# Patient Record
Sex: FEMALE | Race: WHITE | NOT HISPANIC OR LATINO | Employment: FULL TIME | URBAN - METROPOLITAN AREA
[De-identification: names, ages, dates, MRNs, and addresses within clinical notes are randomized per-mention and may not be internally consistent; named-entity substitution may affect disease eponyms.]

---

## 2019-06-19 ENCOUNTER — OFFICE VISIT (OUTPATIENT)
Dept: INTERNAL MEDICINE CLINIC | Facility: CLINIC | Age: 54
End: 2019-06-19
Payer: COMMERCIAL

## 2019-06-19 VITALS
SYSTOLIC BLOOD PRESSURE: 120 MMHG | HEART RATE: 78 BPM | DIASTOLIC BLOOD PRESSURE: 72 MMHG | RESPIRATION RATE: 18 BRPM | WEIGHT: 117 LBS | HEIGHT: 62 IN | BODY MASS INDEX: 21.53 KG/M2

## 2019-06-19 DIAGNOSIS — Z13.6 SCREENING FOR HEART DISEASE: ICD-10-CM

## 2019-06-19 DIAGNOSIS — R53.83 FATIGUE, UNSPECIFIED TYPE: ICD-10-CM

## 2019-06-19 DIAGNOSIS — Z20.5 EXPOSURE TO HEPATITIS: Primary | ICD-10-CM

## 2019-06-19 DIAGNOSIS — M79.10 MYALGIA: ICD-10-CM

## 2019-06-19 PROBLEM — M47.816 LUMBAR FACET JOINT SYNDROME: Status: ACTIVE | Noted: 2019-06-19

## 2019-06-19 PROCEDURE — 3008F BODY MASS INDEX DOCD: CPT | Performed by: PHYSICIAN ASSISTANT

## 2019-06-19 PROCEDURE — 99204 OFFICE O/P NEW MOD 45 MIN: CPT | Performed by: PHYSICIAN ASSISTANT

## 2019-06-22 LAB — HCV AB SER-ACNC: <0.1

## 2019-06-25 LAB
25(OH)D3+25(OH)D2 SERPL-MCNC: 18.7 NG/ML (ref 30–100)
ALBUMIN SERPL-MCNC: 4.7 G/DL (ref 3.5–5.5)
ALBUMIN/GLOB SERPL: 2.1 {RATIO} (ref 1.2–2.2)
ALP SERPL-CCNC: 80 IU/L (ref 39–117)
ALT SERPL-CCNC: 11 IU/L (ref 0–32)
APPEARANCE UR: CLEAR
AST SERPL-CCNC: 20 IU/L (ref 0–40)
BACTERIA URNS QL MICRO: NORMAL
BASOPHILS # BLD AUTO: 0.1 X10E3/UL (ref 0–0.2)
BASOPHILS NFR BLD AUTO: 1 %
BILIRUB SERPL-MCNC: 0.3 MG/DL (ref 0–1.2)
BILIRUB UR QL STRIP: NEGATIVE
BUN SERPL-MCNC: 19 MG/DL (ref 6–24)
BUN/CREAT SERPL: 25 (ref 9–23)
CALCIUM SERPL-MCNC: 10 MG/DL (ref 8.7–10.2)
CHLORIDE SERPL-SCNC: 105 MMOL/L (ref 96–106)
CHOLEST SERPL-MCNC: 240 MG/DL (ref 100–199)
CO2 SERPL-SCNC: 22 MMOL/L (ref 20–29)
COLOR UR: YELLOW
CREAT SERPL-MCNC: 0.76 MG/DL (ref 0.57–1)
EOSINOPHIL # BLD AUTO: 0.5 X10E3/UL (ref 0–0.4)
EOSINOPHIL NFR BLD AUTO: 6 %
EPI CELLS #/AREA URNS HPF: NORMAL /HPF (ref 0–10)
ERYTHROCYTE [DISTWIDTH] IN BLOOD BY AUTOMATED COUNT: 14.1 % (ref 12.3–15.4)
GLOBULIN SER-MCNC: 2.2 G/DL (ref 1.5–4.5)
GLUCOSE SERPL-MCNC: 102 MG/DL (ref 65–99)
GLUCOSE UR QL: NEGATIVE
HAV IGM SERPL QL IA: NEGATIVE
HBV CORE IGM SERPL QL IA: NEGATIVE
HBV SURFACE AG SERPL QL IA: NEGATIVE
HCT VFR BLD AUTO: 42.2 % (ref 34–46.6)
HCV AB S/CO SERPL IA: <0.1 S/CO RATIO (ref 0–0.9)
HDLC SERPL-MCNC: 59 MG/DL
HGB BLD-MCNC: 14.4 G/DL (ref 11.1–15.9)
HGB UR QL STRIP: NEGATIVE
IMM GRANULOCYTES # BLD: 0 X10E3/UL (ref 0–0.1)
IMM GRANULOCYTES NFR BLD: 0 %
KETONES UR QL STRIP: NEGATIVE
LDLC SERPL CALC-MCNC: 148 MG/DL (ref 0–99)
LEUKOCYTE ESTERASE UR QL STRIP: NEGATIVE
LYMPHOCYTES # BLD AUTO: 3.3 X10E3/UL (ref 0.7–3.1)
LYMPHOCYTES NFR BLD AUTO: 36 %
MCH RBC QN AUTO: 31 PG (ref 26.6–33)
MCHC RBC AUTO-ENTMCNC: 34.1 G/DL (ref 31.5–35.7)
MCV RBC AUTO: 91 FL (ref 79–97)
MICRO URNS: NORMAL
MICRO URNS: NORMAL
MONOCYTES # BLD AUTO: 0.5 X10E3/UL (ref 0.1–0.9)
MONOCYTES NFR BLD AUTO: 5 %
NEUTROPHILS # BLD AUTO: 4.9 X10E3/UL (ref 1.4–7)
NEUTROPHILS NFR BLD AUTO: 52 %
NITRITE UR QL STRIP: NEGATIVE
PH UR STRIP: 6 [PH] (ref 5–7.5)
PLATELET # BLD AUTO: 302 X10E3/UL (ref 150–450)
POTASSIUM SERPL-SCNC: 4.4 MMOL/L (ref 3.5–5.2)
PROT SERPL-MCNC: 6.9 G/DL (ref 6–8.5)
PROT UR QL STRIP: NEGATIVE
RBC # BLD AUTO: 4.64 X10E6/UL (ref 3.77–5.28)
RBC #/AREA URNS HPF: NORMAL /HPF (ref 0–2)
SL AMB EGFR AFRICAN AMERICAN: 103 ML/MIN/1.73
SL AMB EGFR NON AFRICAN AMERICAN: 89 ML/MIN/1.73
SL AMB VLDL CHOLESTEROL CALC: 33 MG/DL (ref 5–40)
SODIUM SERPL-SCNC: 143 MMOL/L (ref 134–144)
SP GR UR: 1.01 (ref 1–1.03)
T4 FREE SERPL-MCNC: 1.42 NG/DL (ref 0.82–1.77)
TRIGL SERPL-MCNC: 165 MG/DL (ref 0–149)
TSH SERPL DL<=0.005 MIU/L-ACNC: 1.28 UIU/ML (ref 0.45–4.5)
UROBILINOGEN UR STRIP-ACNC: 0.2 EU/DL (ref 0.2–1)
WBC # BLD AUTO: 9.2 X10E3/UL (ref 3.4–10.8)
WBC #/AREA URNS HPF: NORMAL /HPF (ref 0–5)

## 2019-07-17 ENCOUNTER — OFFICE VISIT (OUTPATIENT)
Dept: INTERNAL MEDICINE CLINIC | Facility: CLINIC | Age: 54
End: 2019-07-17
Payer: COMMERCIAL

## 2019-07-17 VITALS
HEART RATE: 73 BPM | DIASTOLIC BLOOD PRESSURE: 74 MMHG | BODY MASS INDEX: 21.71 KG/M2 | OXYGEN SATURATION: 99 % | WEIGHT: 118 LBS | SYSTOLIC BLOOD PRESSURE: 124 MMHG | TEMPERATURE: 98 F | HEIGHT: 62 IN | RESPIRATION RATE: 18 BRPM

## 2019-07-17 DIAGNOSIS — E78.5 HYPERLIPIDEMIA, UNSPECIFIED HYPERLIPIDEMIA TYPE: Primary | ICD-10-CM

## 2019-07-17 DIAGNOSIS — E55.9 VITAMIN D DEFICIENCY: ICD-10-CM

## 2019-07-17 PROCEDURE — 3008F BODY MASS INDEX DOCD: CPT | Performed by: PHYSICIAN ASSISTANT

## 2019-07-17 PROCEDURE — 99213 OFFICE O/P EST LOW 20 MIN: CPT | Performed by: PHYSICIAN ASSISTANT

## 2019-07-17 RX ORDER — ERGOCALCIFEROL 1.25 MG/1
50000 CAPSULE ORAL WEEKLY
Qty: 4 CAPSULE | Refills: 0 | Status: SHIPPED | OUTPATIENT
Start: 2019-07-17 | End: 2019-08-16 | Stop reason: SDUPTHER

## 2019-07-17 RX ORDER — ATORVASTATIN CALCIUM 10 MG/1
10 TABLET, FILM COATED ORAL DAILY
Qty: 90 TABLET | Refills: 1 | Status: SHIPPED | OUTPATIENT
Start: 2019-07-17 | End: 2020-01-10

## 2019-07-17 NOTE — PATIENT INSTRUCTIONS
Will initiate vitamin-D therapy with vitamin-D to 48810 units once weekly  Will also have patient start atorvastatin (Lipitor) 10 mg 1 at bedtime for elevated cholesterol  Will is patient to obtain OTC Co Q10 intake 1 daily  Schedule follow-up with repeat labs in 4 months  Sooner as needed

## 2019-07-17 NOTE — PROGRESS NOTES
Assessment/Plan:   Patient Instructions   Will initiate vitamin-D therapy with vitamin-D to 50889 units once weekly  Will also have patient start atorvastatin (Lipitor) 10 mg 1 at bedtime for elevated cholesterol  Will is patient to obtain OTC Co Q10 intake 1 daily  Schedule follow-up with repeat labs in 4 months  Sooner as needed  Return in about 4 months (around 11/17/2019) for Next scheduled follow up  Diagnoses and all orders for this visit:    Hyperlipidemia, unspecified hyperlipidemia type  -     Comprehensive metabolic panel; Future  -     Lipid panel; Future  -     atorvastatin (LIPITOR) 10 mg tablet; Take 1 tablet (10 mg total) by mouth daily    Vitamin D deficiency  -     ergocalciferol (VITAMIN D2) 50,000 units; Take 1 capsule (50,000 Units total) by mouth once a week  -     Vitamin D 25 hydroxy; Future          Subjective:      Patient ID: Jacqlyolanda Murillo is a 47 y o  female  Follow-up    Patient reports feeling well  We reviewed her labs today  Vitamin-D deficiency:  Level at 18 7  She is in agreement to start vitamin-D supplementation  She does admit to muscle cramping and twitching  Hyperlipidemia:  Elevated total cholesterol, triglycerides, and LDLs  Family history of hyperlipidemia and coronary artery disease  She is in agreement to start lipid lowering agent  We discussed potential side effects  No other focal complaints  ALLERGIES:  No Known Allergies    CURRENT MEDICATIONS:    Current Outpatient Medications:     atorvastatin (LIPITOR) 10 mg tablet, Take 1 tablet (10 mg total) by mouth daily, Disp: 90 tablet, Rfl: 1    ergocalciferol (VITAMIN D2) 50,000 units, Take 1 capsule (50,000 Units total) by mouth once a week, Disp: 4 capsule, Rfl: 0    ACTIVE PROBLEM LIST:  Patient Active Problem List   Diagnosis    Lumbar facet joint syndrome    Hyperlipidemia    Vitamin D deficiency       PAST MEDICAL HISTORY:  History reviewed   No pertinent past medical history      PAST SURGICAL HISTORY:  Past Surgical History:   Procedure Laterality Date    NASAL FRACTURE SURGERY      OVARIAN CYST REMOVAL      RADIOFREQUENCY ABLATION      UTERINE FIBROID SURGERY         FAMILY HISTORY:  Family History   Problem Relation Age of Onset    Uterine cancer Mother     Diabetes Mother     Coronary artery disease Father     Lung cancer Father     Uterine cancer Sister     Hyperlipidemia Brother     Coronary artery disease Maternal Grandmother     Cancer Maternal Grandfather     Coronary artery disease Paternal Grandfather        SOCIAL HISTORY:  Social History     Socioeconomic History    Marital status: Single     Spouse name: Not on file    Number of children: Not on file    Years of education: Not on file    Highest education level: Not on file   Occupational History    Not on file   Social Needs    Financial resource strain: Not on file    Food insecurity:     Worry: Not on file     Inability: Not on file    Transportation needs:     Medical: Not on file     Non-medical: Not on file   Tobacco Use    Smoking status: Current Every Day Smoker     Packs/day: 0 50     Years: 30 00     Pack years: 15 00    Smokeless tobacco: Never Used   Substance and Sexual Activity    Alcohol use: Yes     Frequency: Monthly or less     Drinks per session: 1 or 2    Drug use: Never    Sexual activity: Yes     Partners: Male   Lifestyle    Physical activity:     Days per week: Not on file     Minutes per session: Not on file    Stress: Not on file   Relationships    Social connections:     Talks on phone: Not on file     Gets together: Not on file     Attends [employer] service: Not on file     Active member of club or organization: Not on file     Attends meetings of clubs or organizations: Not on file     Relationship status: Not on file    Intimate partner violence:     Fear of current or ex partner: Not on file     Emotionally abused: Not on file     Physically abused: Not on file     Forced sexual activity: Not on file   Other Topics Concern    Not on file   Social History Narrative        No children        Hobbies-horseback riding    Regular dental care, brushes twice a day       Review of Systems   Constitutional: Negative for activity change, chills, fatigue and fever  HENT: Negative for congestion  Eyes: Negative for discharge  Respiratory: Negative for cough, chest tightness and shortness of breath  Cardiovascular: Negative for chest pain, palpitations and leg swelling  Gastrointestinal: Negative for abdominal pain  Genitourinary: Negative for difficulty urinating  Musculoskeletal: Negative for arthralgias and myalgias  Skin: Negative for rash  Allergic/Immunologic: Negative for immunocompromised state  Neurological: Negative for dizziness, syncope, weakness, light-headedness and headaches  Hematological: Negative for adenopathy  Does not bruise/bleed easily  Psychiatric/Behavioral: Negative for dysphoric mood and sleep disturbance  The patient is not nervous/anxious  Objective:  Vitals:    07/17/19 1511   BP: 124/74   BP Location: Left arm   Patient Position: Sitting   Pulse: 73   Resp: 18   Temp: 98 °F (36 7 °C)   SpO2: 99%   Weight: 53 5 kg (118 lb)   Height: 5' 2" (1 575 m)     Body mass index is 21 58 kg/m²  Physical Exam   Constitutional: She is oriented to person, place, and time  She appears well-developed and well-nourished  No distress  Neck: Neck supple  Cardiovascular: Normal rate, regular rhythm and normal heart sounds  Pulmonary/Chest: Effort normal and breath sounds normal    Musculoskeletal: She exhibits no edema  Lymphadenopathy:     She has no cervical adenopathy  Neurological: She is alert and oriented to person, place, and time  Skin: Skin is warm and dry  No rash noted  Psychiatric: She has a normal mood and affect   Her behavior is normal    Nursing note and vitals reviewed          RESULTS:    Recent Results (from the past 1008 hour(s))   Hepatitis C antibody    Collection Time: 06/22/19 12:00 AM   Result Value Ref Range    HEP C AB <0 1    CBC and differential    Collection Time: 06/22/19 10:22 AM   Result Value Ref Range    White Blood Cell Count 9 2 3 4 - 10 8 x10E3/uL    Red Blood Cell Count 4 64 3 77 - 5 28 x10E6/uL    Hemoglobin 14 4 11 1 - 15 9 g/dL    HCT 42 2 34 0 - 46 6 %    MCV 91 79 - 97 fL    MCH 31 0 26 6 - 33 0 pg    MCHC 34 1 31 5 - 35 7 g/dL    RDW 14 1 12 3 - 15 4 %    Platelet Count 275 793 - 450 x10E3/uL    Neutrophils 52 Not Estab  %    Lymphocytes 36 Not Estab  %    Monocytes 5 Not Estab  %    Eosinophils 6 Not Estab  %    Basophils PCT 1 Not Estab  %    Neutrophils (Absolute) 4 9 1 4 - 7 0 x10E3/uL    Lymphocytes (Absolute) 3 3 (H) 0 7 - 3 1 x10E3/uL    Monocytes (Absolute) 0 5 0 1 - 0 9 x10E3/uL    Eosinophils (Absolute) 0 5 (H) 0 0 - 0 4 x10E3/uL    Basophils ABS 0 1 0 0 - 0 2 x10E3/uL    Immature Granulocytes 0 Not Estab  %    Immature Granulocytes (Absolute) 0 0 0 0 - 0 1 x10E3/uL   Comprehensive metabolic panel    Collection Time: 06/22/19 10:22 AM   Result Value Ref Range    Glucose, Random 102 (H) 65 - 99 mg/dL    BUN 19 6 - 24 mg/dL    Creatinine 0 76 0 57 - 1 00 mg/dL    eGFR Non  89 >59 mL/min/1 73    eGFR  103 >59 mL/min/1 73    SL AMB BUN/CREATININE RATIO 25 (H) 9 - 23    Sodium 143 134 - 144 mmol/L    Potassium 4 4 3 5 - 5 2 mmol/L    Chloride 105 96 - 106 mmol/L    CO2 22 20 - 29 mmol/L    CALCIUM 10 0 8 7 - 10 2 mg/dL    Protein, Total 6 9 6 0 - 8 5 g/dL    Albumin 4 7 3 5 - 5 5 g/dL    Globulin, Total 2 2 1 5 - 4 5 g/dL    Albumin/Globulin Ratio 2 1 1 2 - 2 2    TOTAL BILIRUBIN 0 3 0 0 - 1 2 mg/dL    Alk Phos Isoenzymes 80 39 - 117 IU/L    AST 20 0 - 40 IU/L    ALT 11 0 - 32 IU/L   Urinalysis with microscopic    Collection Time: 06/22/19 10:22 AM   Result Value Ref Range    Specific Gravity 1 012 1 005 - 1 030    Ph 6 0 5 0 - 7 5    Color UA Yellow Yellow    Urine Appearance Clear Clear    Leukocyte Esterase Negative Negative    Protein Negative Negative/Trace    Glucose, 24 HR Urine Negative Negative    Ketone, Urine Negative Negative    Blood, Urine Negative Negative    Bilirubin, Urine Negative Negative    Urobilinogen Urine 0 2 0 2 - 1 0 EU/dL    SL AMB NITRITES URINE, QUAL  Negative Negative    Microscopic Examination Comment     Microscopic Examination See below:    Microscopic Examination    Collection Time: 06/22/19 10:22 AM   Result Value Ref Range    SL AMB WBC, URINE None seen 0 - 5 /hpf    RBC, Urine 0-2 0 - 2 /hpf    Epithelial Cells (non renal) 0-10 0 - 10 /hpf    Bacteria, Urine Few None seen/Few   Lipid panel    Collection Time: 06/22/19 10:22 AM   Result Value Ref Range    Cholesterol, Total 240 (H) 100 - 199 mg/dL    Triglycerides 165 (H) 0 - 149 mg/dL    HDL 59 >39 mg/dL    VLDL Cholesterol Calculated 33 5 - 40 mg/dL    LDL Direct 148 (H) 0 - 99 mg/dL   Hepatitis panel, acute    Collection Time: 06/22/19 10:22 AM   Result Value Ref Range    Hep A Ab, IgM Negative Negative    HBsAg Screen Negative Negative    Hep B Core Ab, IgM Negative Negative    HEP C AB <0 1 0 0 - 0 9 s/co ratio   T4, free    Collection Time: 06/22/19 10:22 AM   Result Value Ref Range    Free t4 1 42 0 82 - 1 77 ng/dL   TSH, 3rd generation    Collection Time: 06/22/19 10:22 AM   Result Value Ref Range    TSH 1 280 0 450 - 4 500 uIU/mL   Vitamin D 25 hydroxy    Collection Time: 06/22/19 10:22 AM   Result Value Ref Range    25-HYDROXY VIT D 18 7 (L) 30 0 - 100 0 ng/mL       This note was created with voice recognition software  Phonic, grammatical and spelling errors may be present within the note as a result

## 2019-08-16 DIAGNOSIS — E55.9 VITAMIN D DEFICIENCY: ICD-10-CM

## 2019-08-19 RX ORDER — ERGOCALCIFEROL 1.25 MG/1
CAPSULE ORAL
Qty: 4 CAPSULE | Refills: 5 | Status: SHIPPED | OUTPATIENT
Start: 2019-08-19 | End: 2019-12-10 | Stop reason: ALTCHOICE

## 2019-12-10 ENCOUNTER — OFFICE VISIT (OUTPATIENT)
Dept: INTERNAL MEDICINE CLINIC | Facility: CLINIC | Age: 54
End: 2019-12-10
Payer: COMMERCIAL

## 2019-12-10 VITALS
SYSTOLIC BLOOD PRESSURE: 112 MMHG | DIASTOLIC BLOOD PRESSURE: 82 MMHG | HEART RATE: 87 BPM | RESPIRATION RATE: 16 BRPM | OXYGEN SATURATION: 99 % | HEIGHT: 62 IN | BODY MASS INDEX: 22.08 KG/M2 | WEIGHT: 120 LBS

## 2019-12-10 DIAGNOSIS — Z11.4 SCREENING FOR HIV (HUMAN IMMUNODEFICIENCY VIRUS): ICD-10-CM

## 2019-12-10 DIAGNOSIS — Z12.39 SCREENING FOR BREAST CANCER: ICD-10-CM

## 2019-12-10 DIAGNOSIS — E78.5 HYPERLIPIDEMIA, UNSPECIFIED HYPERLIPIDEMIA TYPE: Primary | ICD-10-CM

## 2019-12-10 LAB
25(OH)D3+25(OH)D2 SERPL-MCNC: 49.9 NG/ML (ref 30–100)
ALBUMIN SERPL-MCNC: 4.3 G/DL (ref 3.5–5.5)
ALBUMIN/GLOB SERPL: 1.9 {RATIO} (ref 1.2–2.2)
ALP SERPL-CCNC: 83 IU/L (ref 39–117)
ALT SERPL-CCNC: 11 IU/L (ref 0–32)
AST SERPL-CCNC: 17 IU/L (ref 0–40)
BILIRUB SERPL-MCNC: 0.3 MG/DL (ref 0–1.2)
BUN SERPL-MCNC: 17 MG/DL (ref 6–24)
BUN/CREAT SERPL: 25 (ref 9–23)
CALCIUM SERPL-MCNC: 9.8 MG/DL (ref 8.7–10.2)
CHLORIDE SERPL-SCNC: 107 MMOL/L (ref 96–106)
CHOLEST SERPL-MCNC: 141 MG/DL (ref 100–199)
CO2 SERPL-SCNC: 23 MMOL/L (ref 20–29)
CREAT SERPL-MCNC: 0.68 MG/DL (ref 0.57–1)
GLOBULIN SER-MCNC: 2.3 G/DL (ref 1.5–4.5)
GLUCOSE SERPL-MCNC: 92 MG/DL (ref 65–99)
HDLC SERPL-MCNC: 58 MG/DL
LDLC SERPL CALC-MCNC: 62 MG/DL (ref 0–99)
POTASSIUM SERPL-SCNC: 4.2 MMOL/L (ref 3.5–5.2)
PROT SERPL-MCNC: 6.6 G/DL (ref 6–8.5)
SL AMB EGFR AFRICAN AMERICAN: 115 ML/MIN/1.73
SL AMB EGFR NON AFRICAN AMERICAN: 99 ML/MIN/1.73
SL AMB VLDL CHOLESTEROL CALC: 21 MG/DL (ref 5–40)
SODIUM SERPL-SCNC: 142 MMOL/L (ref 134–144)
TRIGL SERPL-MCNC: 103 MG/DL (ref 0–149)

## 2019-12-10 PROCEDURE — 3008F BODY MASS INDEX DOCD: CPT | Performed by: PHYSICIAN ASSISTANT

## 2019-12-10 PROCEDURE — 4004F PT TOBACCO SCREEN RCVD TLK: CPT | Performed by: PHYSICIAN ASSISTANT

## 2019-12-10 PROCEDURE — 99213 OFFICE O/P EST LOW 20 MIN: CPT | Performed by: PHYSICIAN ASSISTANT

## 2019-12-10 NOTE — PROGRESS NOTES
Assessment/Plan:   Patient Instructions   Continue your cholesterol medication and Co Q10  Can now start vitamin-D 3 1000 units daily  Follow-up in 6 months with repeat labs prior to that visit  Quality Measures: Tobacco Cessation Counseling: Tobacco cessation counseling was provided  The patient is sincerely urged to quit consumption of tobacco  She is not ready to quit tobacco         No follow-ups on file  Diagnoses and all orders for this visit:    Hyperlipidemia, unspecified hyperlipidemia type  -     Comprehensive metabolic panel; Future  -     Lipid panel; Future    Screening for breast cancer  -     Mammo screening bilateral w 3d & cad; Future    Screening for HIV (human immunodeficiency virus)  -     HIV 1/2 AG-AB combo; Future          Subjective:      Patient ID: Rosio Diaz is a 47 y o  female  Follow-up    Labs reviewed with patient and her  was present  Hyperlipidemia:  Labs show significant improvement on low-dose atorvastatin  Patient denies any side effects  She thought since her lipids were significantly improved she would be able to get off the medication  Had to in lightening her to the fact that this was to be a daily medication  Vitamin-D deficiency:  Marked improvement with vitamin-D now in the normal range  Patient also reports she is feeling much better  She will now go 1 daily supplement  Tobacco abuse:  Again encouraged smoking cessation  She again has told me that she is not ready  HIV screening discussed  Patient in agreement  ALLERGIES:  No Known Allergies    CURRENT MEDICATIONS:    Current Outpatient Medications:     atorvastatin (LIPITOR) 10 mg tablet, Take 1 tablet (10 mg total) by mouth daily, Disp: 90 tablet, Rfl: 1    ACTIVE PROBLEM LIST:  Patient Active Problem List   Diagnosis    Lumbar facet joint syndrome    Hyperlipidemia    Vitamin D deficiency       PAST MEDICAL HISTORY:  History reviewed   No pertinent past medical history      PAST SURGICAL HISTORY:  Past Surgical History:   Procedure Laterality Date    NASAL FRACTURE SURGERY      OVARIAN CYST REMOVAL      RADIOFREQUENCY ABLATION      UTERINE FIBROID SURGERY         FAMILY HISTORY:  Family History   Problem Relation Age of Onset    Uterine cancer Mother     Diabetes Mother     Coronary artery disease Father     Lung cancer Father     Uterine cancer Sister     Hyperlipidemia Brother     Coronary artery disease Maternal Grandmother     Cancer Maternal Grandfather     Coronary artery disease Paternal Grandfather        SOCIAL HISTORY:  Social History     Socioeconomic History    Marital status: Single     Spouse name: Not on file    Number of children: Not on file    Years of education: Not on file    Highest education level: Not on file   Occupational History    Not on file   Social Needs    Financial resource strain: Not on file    Food insecurity:     Worry: Not on file     Inability: Not on file    Transportation needs:     Medical: Not on file     Non-medical: Not on file   Tobacco Use    Smoking status: Current Every Day Smoker     Packs/day: 0 50     Years: 30 00     Pack years: 15 00    Smokeless tobacco: Never Used   Substance and Sexual Activity    Alcohol use: Yes     Frequency: Monthly or less     Drinks per session: 1 or 2    Drug use: Never    Sexual activity: Yes     Partners: Male   Lifestyle    Physical activity:     Days per week: Not on file     Minutes per session: Not on file    Stress: Not on file   Relationships    Social connections:     Talks on phone: Not on file     Gets together: Not on file     Attends Buddhist service: Not on file     Active member of club or organization: Not on file     Attends meetings of clubs or organizations: Not on file     Relationship status: Not on file    Intimate partner violence:     Fear of current or ex partner: Not on file     Emotionally abused: Not on file     Physically abused: Not on file     Forced sexual activity: Not on file   Other Topics Concern    Not on file   Social History Narrative        No children        Hobbies-horseback riding    Regular dental care, brushes twice a day       Review of Systems   Constitutional: Negative for activity change, chills, fatigue and fever  HENT: Negative for congestion  Eyes: Negative for discharge  Respiratory: Negative for cough, chest tightness and shortness of breath  Cardiovascular: Negative for chest pain, palpitations and leg swelling  Gastrointestinal: Negative for abdominal pain, blood in stool, constipation, diarrhea, nausea and vomiting  Endocrine: Negative for polydipsia, polyphagia and polyuria  Genitourinary: Negative for difficulty urinating  Musculoskeletal: Negative for arthralgias and myalgias  Skin: Negative for rash  Allergic/Immunologic: Negative for immunocompromised state  Neurological: Negative for dizziness, syncope, weakness, light-headedness and headaches  Hematological: Negative for adenopathy  Does not bruise/bleed easily  Psychiatric/Behavioral: Negative for dysphoric mood, sleep disturbance and suicidal ideas  The patient is not nervous/anxious  Objective:  Vitals:    12/10/19 1535   BP: 112/82   BP Location: Left arm   Patient Position: Sitting   Cuff Size: Standard   Pulse: 87   Resp: 16   SpO2: 99%   Weight: 54 4 kg (120 lb)   Height: 5' 2" (1 575 m)     Body mass index is 21 95 kg/m²  Physical Exam   Constitutional: She is oriented to person, place, and time  She appears well-developed and well-nourished  No distress  Neck: Neck supple  No JVD present  Carotid bruit is not present  Cardiovascular: Normal rate, regular rhythm and normal heart sounds  Pulmonary/Chest: Effort normal and breath sounds normal    Musculoskeletal: She exhibits no edema  Lymphadenopathy:     She has no cervical adenopathy     Neurological: She is alert and oriented to person, place, and time  Skin: Skin is warm and dry  No rash noted  Psychiatric: She has a normal mood and affect  Her behavior is normal    Nursing note and vitals reviewed  RESULTS:    Recent Results (from the past 1008 hour(s))   Comprehensive metabolic panel    Collection Time: 12/07/19  8:33 AM   Result Value Ref Range    Glucose, Random 92 65 - 99 mg/dL    BUN 17 6 - 24 mg/dL    Creatinine 0 68 0 57 - 1 00 mg/dL    eGFR Non African American 99 >59 mL/min/1 73    eGFR  115 >59 mL/min/1 73    SL AMB BUN/CREATININE RATIO 25 (H) 9 - 23    Sodium 142 134 - 144 mmol/L    Potassium 4 2 3 5 - 5 2 mmol/L    Chloride 107 (H) 96 - 106 mmol/L    CO2 23 20 - 29 mmol/L    CALCIUM 9 8 8 7 - 10 2 mg/dL    Protein, Total 6 6 6 0 - 8 5 g/dL    Albumin 4 3 3 5 - 5 5 g/dL    Globulin, Total 2 3 1 5 - 4 5 g/dL    Albumin/Globulin Ratio 1 9 1 2 - 2 2    TOTAL BILIRUBIN 0 3 0 0 - 1 2 mg/dL    Alk Phos Isoenzymes 83 39 - 117 IU/L    AST 17 0 - 40 IU/L    ALT 11 0 - 32 IU/L   Lipid panel    Collection Time: 12/07/19  8:33 AM   Result Value Ref Range    Cholesterol, Total 141 100 - 199 mg/dL    Triglycerides 103 0 - 149 mg/dL    HDL 58 >39 mg/dL    VLDL Cholesterol Calculated 21 5 - 40 mg/dL    LDL Direct 62 0 - 99 mg/dL   Vitamin D 25 hydroxy    Collection Time: 12/07/19  8:33 AM   Result Value Ref Range    25-HYDROXY VIT D 49 9 30 0 - 100 0 ng/mL       This note was created with voice recognition software  Phonic, grammatical and spelling errors may be present within the note as a result

## 2019-12-10 NOTE — PATIENT INSTRUCTIONS
Continue your cholesterol medication and Co Q10  Can now start vitamin-D 3 1000 units daily  Follow-up in 6 months with repeat labs prior to that visit

## 2020-01-10 DIAGNOSIS — E78.5 HYPERLIPIDEMIA, UNSPECIFIED HYPERLIPIDEMIA TYPE: ICD-10-CM

## 2020-01-10 RX ORDER — ATORVASTATIN CALCIUM 10 MG/1
TABLET, FILM COATED ORAL
Qty: 90 TABLET | Refills: 1 | Status: SHIPPED | OUTPATIENT
Start: 2020-01-10 | End: 2020-07-20

## 2020-06-17 LAB — EXTERNAL HIV SCREEN: NORMAL

## 2020-06-19 LAB
ALBUMIN SERPL-MCNC: 4.6 G/DL (ref 3.8–4.9)
ALBUMIN/GLOB SERPL: 2.1 {RATIO} (ref 1.2–2.2)
ALP SERPL-CCNC: 85 IU/L (ref 39–117)
ALT SERPL-CCNC: 17 IU/L (ref 0–32)
AST SERPL-CCNC: 20 IU/L (ref 0–40)
BILIRUB SERPL-MCNC: 0.4 MG/DL (ref 0–1.2)
BUN SERPL-MCNC: 22 MG/DL (ref 6–24)
BUN/CREAT SERPL: 28 (ref 9–23)
CALCIUM SERPL-MCNC: 9.8 MG/DL (ref 8.7–10.2)
CHLORIDE SERPL-SCNC: 103 MMOL/L (ref 96–106)
CHOLEST SERPL-MCNC: 165 MG/DL (ref 100–199)
CO2 SERPL-SCNC: 23 MMOL/L (ref 20–29)
CREAT SERPL-MCNC: 0.8 MG/DL (ref 0.57–1)
GLOBULIN SER-MCNC: 2.2 G/DL (ref 1.5–4.5)
GLUCOSE SERPL-MCNC: 78 MG/DL (ref 65–99)
HDLC SERPL-MCNC: 57 MG/DL
HIV 1+2 AB+HIV1 P24 AG SERPL QL IA: NON REACTIVE
LDLC SERPL CALC-MCNC: 86 MG/DL (ref 0–99)
POTASSIUM SERPL-SCNC: 4.5 MMOL/L (ref 3.5–5.2)
PROT SERPL-MCNC: 6.8 G/DL (ref 6–8.5)
SL AMB EGFR AFRICAN AMERICAN: 96 ML/MIN/1.73
SL AMB EGFR NON AFRICAN AMERICAN: 83 ML/MIN/1.73
SL AMB VLDL CHOLESTEROL CALC: 22 MG/DL (ref 5–40)
SODIUM SERPL-SCNC: 141 MMOL/L (ref 134–144)
TRIGL SERPL-MCNC: 111 MG/DL (ref 0–149)

## 2020-07-02 ENCOUNTER — OFFICE VISIT (OUTPATIENT)
Dept: INTERNAL MEDICINE CLINIC | Facility: CLINIC | Age: 55
End: 2020-07-02

## 2020-07-02 ENCOUNTER — TELEPHONE (OUTPATIENT)
Dept: ADMINISTRATIVE | Facility: OTHER | Age: 55
End: 2020-07-02

## 2020-07-02 VITALS
SYSTOLIC BLOOD PRESSURE: 106 MMHG | WEIGHT: 122 LBS | BODY MASS INDEX: 22.45 KG/M2 | HEART RATE: 95 BPM | RESPIRATION RATE: 16 BRPM | TEMPERATURE: 98.3 F | DIASTOLIC BLOOD PRESSURE: 68 MMHG | HEIGHT: 62 IN

## 2020-07-02 DIAGNOSIS — Z72.0 TOBACCO ABUSE: ICD-10-CM

## 2020-07-02 DIAGNOSIS — J98.01 BRONCHOSPASM: ICD-10-CM

## 2020-07-02 DIAGNOSIS — E78.5 HYPERLIPIDEMIA, UNSPECIFIED HYPERLIPIDEMIA TYPE: Primary | ICD-10-CM

## 2020-07-02 PROCEDURE — 4004F PT TOBACCO SCREEN RCVD TLK: CPT | Performed by: PHYSICIAN ASSISTANT

## 2020-07-02 PROCEDURE — 3008F BODY MASS INDEX DOCD: CPT | Performed by: PHYSICIAN ASSISTANT

## 2020-07-02 PROCEDURE — 99212 OFFICE O/P EST SF 10 MIN: CPT | Performed by: PHYSICIAN ASSISTANT

## 2020-07-02 RX ORDER — ALBUTEROL SULFATE 90 UG/1
2 AEROSOL, METERED RESPIRATORY (INHALATION) EVERY 6 HOURS PRN
Qty: 1 INHALER | Refills: 5 | Status: SHIPPED | OUTPATIENT
Start: 2020-07-02 | End: 2021-10-18

## 2020-07-02 NOTE — PROGRESS NOTES
Assessment/Plan:   Patient Instructions   No change in current medication as cholesterol is under good control  Will add albuterol inhaler 1-2 puffs every 6 hours as needed for wheezing, however I a m  Recommending you use it regularly for the next 5-10 days  Then potentially can use as needed  Consider smoking cessation as we discussed  Otherwise follow-up in 6 months, sooner as needed  Quality Measures:       Return in about 6 months (around 1/2/2021) for Next scheduled follow up  Diagnoses and all orders for this visit:    Hyperlipidemia, unspecified hyperlipidemia type  -     Comprehensive metabolic panel; Future  -     Lipid panel; Future    Bronchospasm  -     albuterol (Ventolin HFA) 90 mcg/act inhaler; Inhale 2 puffs every 6 (six) hours as needed for wheezing    Tobacco abuse          Subjective:      Patient ID: Meenakshi Hightower is a 54 y o  female  Follow-up    Hyperlipidemia:  Good control on current medication  Denies side effects  Labs reviewed with patient  Ongoing cough since March  He is a smoker currently at about 16 cigarettes per day  No fever chills  Tobacco abuse:  Long discussion held with patient and her  on the different modes of smoking cessation that are available to her  Patient will considering get back to me  ALLERGIES:  No Known Allergies    CURRENT MEDICATIONS:    Current Outpatient Medications:     atorvastatin (LIPITOR) 10 mg tablet, TAKE 1 TABLET BY MOUTH EVERY DAY, Disp: 90 tablet, Rfl: 1    albuterol (Ventolin HFA) 90 mcg/act inhaler, Inhale 2 puffs every 6 (six) hours as needed for wheezing, Disp: 1 Inhaler, Rfl: 5    ACTIVE PROBLEM LIST:  Patient Active Problem List   Diagnosis    Lumbar facet joint syndrome    Hyperlipidemia    Vitamin D deficiency       PAST MEDICAL HISTORY:  History reviewed  No pertinent past medical history      PAST SURGICAL HISTORY:  Past Surgical History:   Procedure Laterality Date    NASAL FRACTURE SURGERY  OVARIAN CYST REMOVAL      RADIOFREQUENCY ABLATION      UTERINE FIBROID SURGERY         FAMILY HISTORY:  Family History   Problem Relation Age of Onset    Uterine cancer Mother     Diabetes Mother     Coronary artery disease Father     Lung cancer Father     Uterine cancer Sister     Hyperlipidemia Brother     Coronary artery disease Maternal Grandmother     Cancer Maternal Grandfather     Coronary artery disease Paternal Grandfather        SOCIAL HISTORY:  Social History     Socioeconomic History    Marital status: Single     Spouse name: Not on file    Number of children: Not on file    Years of education: Not on file    Highest education level: Not on file   Occupational History    Not on file   Social Needs    Financial resource strain: Not on file    Food insecurity:     Worry: Not on file     Inability: Not on file    Transportation needs:     Medical: Not on file     Non-medical: Not on file   Tobacco Use    Smoking status: Current Every Day Smoker     Packs/day: 0 50     Years: 30 00     Pack years: 15 00    Smokeless tobacco: Never Used   Substance and Sexual Activity    Alcohol use: Yes     Frequency: Monthly or less     Drinks per session: 1 or 2    Drug use: Never    Sexual activity: Yes     Partners: Male   Lifestyle    Physical activity:     Days per week: Not on file     Minutes per session: Not on file    Stress: Not on file   Relationships    Social connections:     Talks on phone: Not on file     Gets together: Not on file     Attends Sikh service: Not on file     Active member of club or organization: Not on file     Attends meetings of clubs or organizations: Not on file     Relationship status: Not on file    Intimate partner violence:     Fear of current or ex partner: Not on file     Emotionally abused: Not on file     Physically abused: Not on file     Forced sexual activity: Not on file   Other Topics Concern    Not on file   Social History Narrative     No children        Hobbies-horseback riding    Regular dental care, brushes twice a day       Review of Systems   Constitutional: Negative for activity change, chills, fatigue and fever  HENT: Negative for congestion and postnasal drip  Eyes: Negative for discharge and visual disturbance  Respiratory: Negative for cough, chest tightness and shortness of breath  Cardiovascular: Negative for chest pain, palpitations and leg swelling  Gastrointestinal: Negative for abdominal pain, blood in stool, constipation, diarrhea and nausea  Endocrine: Negative for polydipsia, polyphagia and polyuria  Genitourinary: Negative for difficulty urinating  Musculoskeletal: Negative for arthralgias and myalgias  Skin: Negative for rash  Allergic/Immunologic: Negative for immunocompromised state  Neurological: Negative for dizziness, syncope, weakness, light-headedness and headaches  Hematological: Negative for adenopathy  Does not bruise/bleed easily  Psychiatric/Behavioral: Negative for dysphoric mood, sleep disturbance and suicidal ideas  The patient is not nervous/anxious  Objective:  Vitals:    07/02/20 0945   BP: 106/68   BP Location: Left arm   Patient Position: Sitting   Cuff Size: Standard   Pulse: 95   Resp: 16   Temp: 98 3 °F (36 8 °C)   TempSrc: Tympanic   Weight: 55 3 kg (122 lb)   Height: 5' 2" (1 575 m)     Body mass index is 22 31 kg/m²  Physical Exam   Constitutional: She is oriented to person, place, and time  She appears well-developed and well-nourished  No distress  HENT:   Head: Normocephalic and atraumatic  Eyes: Pupils are equal, round, and reactive to light  Neck: No JVD present  Carotid bruit is not present  Cardiovascular: Normal rate, regular rhythm and normal heart sounds  Pulmonary/Chest: Effort normal  No respiratory distress  She has wheezes     Scattered late insp wheezes with forced exp wheezes that cause coughing Musculoskeletal: She exhibits no edema  Lymphadenopathy:     She has no cervical adenopathy  Neurological: She is alert and oriented to person, place, and time  Skin: Skin is warm and dry  No rash noted  Psychiatric: She has a normal mood and affect  Her behavior is normal    Nursing note and vitals reviewed  RESULTS:    Recent Results (from the past 1008 hour(s))   Human Immunodeficiency Virus 1/2 Antigen / Antibody ( Fourth Generation) with Reflex Testing    Collection Time: 06/17/20 12:00 AM   Result Value Ref Range    HIV Screen Non-Reactive    Comprehensive metabolic panel    Collection Time: 06/17/20 12:00 AM   Result Value Ref Range    Glucose, Random 78 65 - 99 mg/dL    BUN 22 6 - 24 mg/dL    Creatinine 0 80 0 57 - 1 00 mg/dL    eGFR Non  83 >59 mL/min/1 73    eGFR  96 >59 mL/min/1 73    SL AMB BUN/CREATININE RATIO 28 (H) 9 - 23    Sodium 141 134 - 144 mmol/L    Potassium 4 5 3 5 - 5 2 mmol/L    Chloride 103 96 - 106 mmol/L    CO2 23 20 - 29 mmol/L    CALCIUM 9 8 8 7 - 10 2 mg/dL    Protein, Total 6 8 6 0 - 8 5 g/dL    Albumin 4 6 3 8 - 4 9 g/dL    Globulin, Total 2 2 1 5 - 4 5 g/dL    Albumin/Globulin Ratio 2 1 1 2 - 2 2    TOTAL BILIRUBIN 0 4 0 0 - 1 2 mg/dL    Alk Phos Isoenzymes 85 39 - 117 IU/L    AST 20 0 - 40 IU/L    ALT 17 0 - 32 IU/L   Lipid panel    Collection Time: 06/17/20 12:00 AM   Result Value Ref Range    Cholesterol, Total 165 100 - 199 mg/dL    Triglycerides 111 0 - 149 mg/dL    HDL 57 >39 mg/dL    VLDL Cholesterol Calculated 22 5 - 40 mg/dL    LDL Calculated 86 0 - 99 mg/dL   Human Immunodeficiency Virus 1/2 Antigen / Antibody ( Fourth Generation) with Reflex Testing    Collection Time: 06/17/20 12:00 AM   Result Value Ref Range    HIV Screen 4th Generation wRflx Non Reactive Non Reactive       This note was created with voice recognition software  Phonic, grammatical and spelling errors may be present within the note as a result

## 2020-07-02 NOTE — TELEPHONE ENCOUNTER
Upon review of the In Basket request we were able to locate, review, and update the patient chart as requested for Mammogram     Any additional questions or concerns should be emailed to the Practice Liaisons via [email or web address] email, please do not reply via In Basket      Thank you  Pop Urias MA

## 2020-07-02 NOTE — TELEPHONE ENCOUNTER
----- Message from Beto Vaca, [address] [place] sent at 7/2/2020  9:48 AM EDT -----  Regarding: mammo  07/02/20 9:50 AM    Hello, our patient Angeles Rodriguez has had Mammogram completed/performed  Please assist in updating the patient chart by pulling the Care Everywhere (CE) document  The date of service is 6/2/20       Thank you,  Beto Vaca MA  [facility]

## 2020-07-02 NOTE — PATIENT INSTRUCTIONS
No change in current medication as cholesterol is under good control  Will add albuterol inhaler 1-2 puffs every 6 hours as needed for wheezing, however I a m  Recommending you use it regularly for the next 5-10 days  Then potentially can use as needed  Consider smoking cessation as we discussed  Otherwise follow-up in 6 months, sooner as needed

## 2020-07-20 DIAGNOSIS — E78.5 HYPERLIPIDEMIA, UNSPECIFIED HYPERLIPIDEMIA TYPE: ICD-10-CM

## 2020-07-20 RX ORDER — ATORVASTATIN CALCIUM 10 MG/1
TABLET, FILM COATED ORAL
Qty: 90 TABLET | Refills: 1 | Status: SHIPPED | OUTPATIENT
Start: 2020-07-20 | End: 2021-01-17

## 2021-01-06 LAB
ALBUMIN SERPL-MCNC: 4.6 G/DL (ref 3.8–4.9)
ALBUMIN/GLOB SERPL: 1.8 {RATIO} (ref 1.2–2.2)
ALP SERPL-CCNC: 95 IU/L (ref 39–117)
ALT SERPL-CCNC: 21 IU/L (ref 0–32)
AST SERPL-CCNC: 24 IU/L (ref 0–40)
BILIRUB SERPL-MCNC: 0.3 MG/DL (ref 0–1.2)
BUN SERPL-MCNC: 14 MG/DL (ref 6–24)
BUN/CREAT SERPL: 21 (ref 9–23)
CALCIUM SERPL-MCNC: 10 MG/DL (ref 8.7–10.2)
CHLORIDE SERPL-SCNC: 104 MMOL/L (ref 96–106)
CHOLEST SERPL-MCNC: 170 MG/DL (ref 100–199)
CO2 SERPL-SCNC: 23 MMOL/L (ref 20–29)
CREAT SERPL-MCNC: 0.67 MG/DL (ref 0.57–1)
GLOBULIN SER-MCNC: 2.5 G/DL (ref 1.5–4.5)
GLUCOSE SERPL-MCNC: 90 MG/DL (ref 65–99)
HDLC SERPL-MCNC: 60 MG/DL
LDLC SERPL CALC-MCNC: 86 MG/DL (ref 0–99)
POTASSIUM SERPL-SCNC: 4.6 MMOL/L (ref 3.5–5.2)
PROT SERPL-MCNC: 7.1 G/DL (ref 6–8.5)
SL AMB EGFR AFRICAN AMERICAN: 114 ML/MIN/1.73
SL AMB EGFR NON AFRICAN AMERICAN: 99 ML/MIN/1.73
SL AMB VLDL CHOLESTEROL CALC: 24 MG/DL (ref 5–40)
SODIUM SERPL-SCNC: 142 MMOL/L (ref 134–144)
TRIGL SERPL-MCNC: 141 MG/DL (ref 0–149)

## 2021-01-15 ENCOUNTER — OFFICE VISIT (OUTPATIENT)
Dept: INTERNAL MEDICINE CLINIC | Facility: CLINIC | Age: 56
End: 2021-01-15
Payer: COMMERCIAL

## 2021-01-15 VITALS
SYSTOLIC BLOOD PRESSURE: 102 MMHG | BODY MASS INDEX: 22.45 KG/M2 | WEIGHT: 122 LBS | HEIGHT: 62 IN | RESPIRATION RATE: 18 BRPM | HEART RATE: 81 BPM | TEMPERATURE: 98.6 F | DIASTOLIC BLOOD PRESSURE: 78 MMHG | OXYGEN SATURATION: 99 %

## 2021-01-15 DIAGNOSIS — E78.2 MIXED HYPERLIPIDEMIA: Primary | ICD-10-CM

## 2021-01-15 PROCEDURE — 99213 OFFICE O/P EST LOW 20 MIN: CPT | Performed by: PHYSICIAN ASSISTANT

## 2021-01-15 PROCEDURE — 4004F PT TOBACCO SCREEN RCVD TLK: CPT | Performed by: PHYSICIAN ASSISTANT

## 2021-01-15 RX ORDER — PENICILLIN V POTASSIUM 500 MG/1
500 TABLET ORAL 2 TIMES DAILY
COMMUNITY
Start: 2021-01-04 | End: 2022-03-23 | Stop reason: ALTCHOICE

## 2021-01-15 NOTE — PATIENT INSTRUCTIONS
Continue current medications  Continue to working on smoking cessation  Follow up in 6 months, sooner as needed

## 2021-01-15 NOTE — PROGRESS NOTES
Assessment/Plan:   Patient Instructions   Continue current medications  Continue to working on smoking cessation  Follow up in 6 months, sooner as needed  Quality Measures:       Return in about 6 months (around 7/15/2021) for Annual physical, please make appt for last Pt on a Friday for skin tag removal          Diagnoses and all orders for this visit:    Mixed hyperlipidemia  -     Comprehensive metabolic panel; Future  -     Lipid panel; Future    Other orders  -     penicillin V potassium (VEETID) 500 mg tablet; TAKE 2 TABLETS BY MOUTH NOW, THEN TAKE 1 TABLET BY MOUTH 4 TIMES A DAY FOR 7 DAYS          Subjective:      Patient ID: Reed Tolentino is a 54 y o  female  Follow-up    Hyperlipidemia:  On atorvastatin 10 mg daily, denies side effects  Lipid panel shows good control  ASCVD  Risk score is 2 4% which is good  Tobacco abuse:  Patient remark she is getting ready to quit smoking  She would like her  to join her  She states she will have no trouble doing this by "cold turkey"  Patient has a skin tag on her left side which tends to get caught on her clothing, at times becomes red and inflamed  No other focal concerns  ALLERGIES:  No Known Allergies    CURRENT MEDICATIONS:    Current Outpatient Medications:     albuterol (Ventolin HFA) 90 mcg/act inhaler, Inhale 2 puffs every 6 (six) hours as needed for wheezing, Disp: 1 Inhaler, Rfl: 5    atorvastatin (LIPITOR) 10 mg tablet, TAKE 1 TABLET BY MOUTH EVERY DAY, Disp: 90 tablet, Rfl: 1    penicillin V potassium (VEETID) 500 mg tablet, TAKE 2 TABLETS BY MOUTH NOW, THEN TAKE 1 TABLET BY MOUTH 4 TIMES A DAY FOR 7 DAYS, Disp: , Rfl:     ACTIVE PROBLEM LIST:  Patient Active Problem List   Diagnosis    Lumbar facet joint syndrome    Hyperlipidemia    Vitamin D deficiency       PAST MEDICAL HISTORY:  History reviewed  No pertinent past medical history      PAST SURGICAL HISTORY:  Past Surgical History:   Procedure Laterality Date  NASAL FRACTURE SURGERY      OVARIAN CYST REMOVAL      RADIOFREQUENCY ABLATION      UTERINE FIBROID SURGERY         FAMILY HISTORY:  Family History   Problem Relation Age of Onset    Uterine cancer Mother     Diabetes Mother     Coronary artery disease Father     Lung cancer Father     Uterine cancer Sister     Hyperlipidemia Brother     Coronary artery disease Maternal Grandmother     Cancer Maternal Grandfather     Coronary artery disease Paternal Grandfather        SOCIAL HISTORY:  Social History     Socioeconomic History    Marital status: Single     Spouse name: Not on file    Number of children: Not on file    Years of education: Not on file    Highest education level: Not on file   Occupational History    Not on file   Social Needs    Financial resource strain: Not on file    Food insecurity     Worry: Not on file     Inability: Not on file    Transportation needs     Medical: Not on file     Non-medical: Not on file   Tobacco Use    Smoking status: Current Every Day Smoker     Packs/day: 0 50     Years: 30 00     Pack years: 15 00    Smokeless tobacco: Never Used   Substance and Sexual Activity    Alcohol use: Yes     Frequency: Monthly or less     Drinks per session: 1 or 2    Drug use: Never    Sexual activity: Yes     Partners: Male   Lifestyle    Physical activity     Days per week: Not on file     Minutes per session: Not on file    Stress: Not on file   Relationships    Social connections     Talks on phone: Not on file     Gets together: Not on file     Attends [employer] service: Not on file     Active member of club or organization: Not on file     Attends meetings of clubs or organizations: Not on file     Relationship status: Not on file    Intimate partner violence     Fear of current or ex partner: Not on file     Emotionally abused: Not on file     Physically abused: Not on file     Forced sexual activity: Not on file   Other Topics Concern    Not on file Social History Narrative        No children        Hobbies-horseback riding    Regular dental care, brushes twice a day       Review of Systems   Constitutional: Negative for activity change, chills, fatigue and fever  HENT: Negative for congestion  Eyes: Negative for discharge  Respiratory: Negative for cough, chest tightness and shortness of breath  Cardiovascular: Negative for chest pain, palpitations and leg swelling  Gastrointestinal: Negative for abdominal pain, blood in stool, constipation, diarrhea, nausea and vomiting  Endocrine: Negative for polydipsia, polyphagia and polyuria  Genitourinary: Negative for difficulty urinating  Musculoskeletal: Negative for arthralgias and myalgias  Skin: Negative for rash  Allergic/Immunologic: Negative for immunocompromised state  Neurological: Negative for dizziness, syncope, weakness, light-headedness and headaches  Hematological: Negative for adenopathy  Does not bruise/bleed easily  Psychiatric/Behavioral: Negative for dysphoric mood, sleep disturbance and suicidal ideas  The patient is not nervous/anxious  Objective:  Vitals:    01/15/21 0931   BP: 102/78   BP Location: Right arm   Patient Position: Sitting   Cuff Size: Standard   Pulse: 81   Resp: 18   Temp: 98 6 °F (37 °C)   TempSrc: Tympanic   SpO2: 99%   Weight: 90 5 kg (199 lb 9 6 oz)   Height: 5' 2" (1 575 m)     Body mass index is 36 51 kg/m²  Physical Exam  Vitals signs and nursing note reviewed  Constitutional:       General: She is not in acute distress  Appearance: She is well-developed  HENT:      Head: Normocephalic and atraumatic  Eyes:      Pupils: Pupils are equal, round, and reactive to light  Neck:      Musculoskeletal: Neck supple  Thyroid: No thyromegaly  Vascular: No carotid bruit or JVD  Cardiovascular:      Rate and Rhythm: Normal rate and regular rhythm  Heart sounds: Normal heart sounds  Pulmonary:      Effort: Pulmonary effort is normal  No respiratory distress  Breath sounds: Normal breath sounds  Musculoskeletal:      Right lower leg: No edema  Left lower leg: No edema  Lymphadenopathy:      Cervical: No cervical adenopathy  Skin:     General: Skin is warm and dry  Findings: No rash  Comments:   Skin tag present left side  Currently no signs of infection  Neurological:      General: No focal deficit present  Mental Status: She is alert and oriented to person, place, and time  Mental status is at baseline  Psychiatric:         Mood and Affect: Mood normal          Behavior: Behavior normal            RESULTS:    Recent Results (from the past 1008 hour(s))   Comprehensive metabolic panel    Collection Time: 01/05/21 10:11 AM   Result Value Ref Range    Glucose, Random 90 65 - 99 mg/dL    BUN 14 6 - 24 mg/dL    Creatinine 0 67 0 57 - 1 00 mg/dL    eGFR Non African American 99 >59 mL/min/1 73    eGFR  114 >59 mL/min/1 73    SL AMB BUN/CREATININE RATIO 21 9 - 23    Sodium 142 134 - 144 mmol/L    Potassium 4 6 3 5 - 5 2 mmol/L    Chloride 104 96 - 106 mmol/L    CO2 23 20 - 29 mmol/L    CALCIUM 10 0 8 7 - 10 2 mg/dL    Protein, Total 7 1 6 0 - 8 5 g/dL    Albumin 4 6 3 8 - 4 9 g/dL    Globulin, Total 2 5 1 5 - 4 5 g/dL    Albumin/Globulin Ratio 1 8 1 2 - 2 2    TOTAL BILIRUBIN 0 3 0 0 - 1 2 mg/dL    Alk Phos Isoenzymes 95 39 - 117 IU/L    AST 24 0 - 40 IU/L    ALT 21 0 - 32 IU/L   Lipid panel    Collection Time: 01/05/21 10:11 AM   Result Value Ref Range    Cholesterol, Total 170 100 - 199 mg/dL    Triglycerides 141 0 - 149 mg/dL    HDL 60 >39 mg/dL    VLDL Cholesterol Calculated 24 5 - 40 mg/dL    LDL Calculated 86 0 - 99 mg/dL       This note was created with voice recognition software  Phonic, grammatical and spelling errors may be present within the note as a result

## 2021-01-17 DIAGNOSIS — E78.5 HYPERLIPIDEMIA, UNSPECIFIED HYPERLIPIDEMIA TYPE: ICD-10-CM

## 2021-01-17 RX ORDER — ATORVASTATIN CALCIUM 10 MG/1
TABLET, FILM COATED ORAL
Qty: 90 TABLET | Refills: 1 | Status: SHIPPED | OUTPATIENT
Start: 2021-01-17 | End: 2021-07-20

## 2021-01-18 ENCOUNTER — TELEPHONE (OUTPATIENT)
Dept: ADMINISTRATIVE | Facility: OTHER | Age: 56
End: 2021-01-18

## 2021-01-18 NOTE — TELEPHONE ENCOUNTER
Upon review of the In Basket request we were able to locate, review, and update the patient chart as requested for Pap Smear (HPV) aka Cervical Cancer Screening  Any additional questions or concerns should be emailed to the Practice Liaisons via [email or web address] email, please do not reply via In Basket      Thank you  Amaya Qureshi MA

## 2021-01-18 NOTE — TELEPHONE ENCOUNTER
----- Message from iMlton Quick sent at 1/14/2021  3:46 PM EST -----  Regarding: Pap smear  01/14/21 3:46 PM    Hello, our patient Monica Gill has had Pap Smear (HPV) aka Cervical Cancer Screening completed/performed  Please assist in updating the patient chart by pulling the Care Everywhere (CE) document  The date of service is 8/9/2018       Thank you,  Milton Quick  [facility]

## 2021-01-29 ENCOUNTER — OFFICE VISIT (OUTPATIENT)
Dept: INTERNAL MEDICINE CLINIC | Facility: CLINIC | Age: 56
End: 2021-01-29
Payer: COMMERCIAL

## 2021-01-29 VITALS
TEMPERATURE: 98.4 F | HEIGHT: 62 IN | OXYGEN SATURATION: 97 % | BODY MASS INDEX: 22.23 KG/M2 | SYSTOLIC BLOOD PRESSURE: 122 MMHG | HEART RATE: 87 BPM | DIASTOLIC BLOOD PRESSURE: 78 MMHG | WEIGHT: 120.8 LBS

## 2021-01-29 DIAGNOSIS — L91.8 SKIN TAG: Primary | ICD-10-CM

## 2021-01-29 PROCEDURE — 3008F BODY MASS INDEX DOCD: CPT | Performed by: PHYSICIAN ASSISTANT

## 2021-01-29 PROCEDURE — 11200 RMVL SKIN TAGS UP TO&INC 15: CPT | Performed by: PHYSICIAN ASSISTANT

## 2021-01-29 NOTE — PROGRESS NOTES
Assessment/Plan:      Clean area twice daily with soap and water  Dress with light antibiotic ointment and Band-Aid  Call with any signs of infection  Quality Measures:       No follow-ups on file  Diagnoses and all orders for this visit:    Skin tag  Comments:  inflamed-L axilla          Subjective:      Patient ID: Aries Aguilar is a 54 y o  female  Acute visit     Skin tags left axilla which intermittently becomes inflamed with her bra  Rubbing on it  If possible patient would like this removed  ALLERGIES:  No Known Allergies    CURRENT MEDICATIONS:    Current Outpatient Medications:     albuterol (Ventolin HFA) 90 mcg/act inhaler, Inhale 2 puffs every 6 (six) hours as needed for wheezing, Disp: 1 Inhaler, Rfl: 5    atorvastatin (LIPITOR) 10 mg tablet, TAKE 1 TABLET BY MOUTH EVERY DAY, Disp: 90 tablet, Rfl: 1    penicillin V potassium (VEETID) 500 mg tablet, 500 mg 2 (two) times a day , Disp: , Rfl:     ACTIVE PROBLEM LIST:  Patient Active Problem List   Diagnosis    Lumbar facet joint syndrome    Hyperlipidemia    Vitamin D deficiency       PAST MEDICAL HISTORY:  History reviewed  No pertinent past medical history      PAST SURGICAL HISTORY:  Past Surgical History:   Procedure Laterality Date    NASAL FRACTURE SURGERY      OVARIAN CYST REMOVAL      RADIOFREQUENCY ABLATION      UTERINE FIBROID SURGERY         FAMILY HISTORY:  Family History   Problem Relation Age of Onset    Uterine cancer Mother     Diabetes Mother     Coronary artery disease Father     Lung cancer Father     Uterine cancer Sister     Hyperlipidemia Brother     Coronary artery disease Maternal Grandmother     Cancer Maternal Grandfather     Coronary artery disease Paternal Grandfather        SOCIAL HISTORY:  Social History     Socioeconomic History    Marital status: Single     Spouse name: Not on file    Number of children: Not on file    Years of education: Not on file    Highest education level: Not on file   Occupational History    Not on file   Social Needs    Financial resource strain: Not on file    Food insecurity     Worry: Not on file     Inability: Not on file    Transportation needs     Medical: Not on file     Non-medical: Not on file   Tobacco Use    Smoking status: Current Every Day Smoker     Packs/day: 0 50     Years: 30 00     Pack years: 15 00    Smokeless tobacco: Never Used   Substance and Sexual Activity    Alcohol use: Yes     Frequency: Monthly or less     Drinks per session: 1 or 2    Drug use: Never    Sexual activity: Yes     Partners: Male   Lifestyle    Physical activity     Days per week: Not on file     Minutes per session: Not on file    Stress: Not on file   Relationships    Social connections     Talks on phone: Not on file     Gets together: Not on file     Attends Gnosticist service: Not on file     Active member of club or organization: Not on file     Attends meetings of clubs or organizations: Not on file     Relationship status: Not on file    Intimate partner violence     Fear of current or ex partner: Not on file     Emotionally abused: Not on file     Physically abused: Not on file     Forced sexual activity: Not on file   Other Topics Concern    Not on file   Social History Narrative        No children        Hobbies-horseback riding    Regular dental care, brushes twice a day       Review of Systems   Constitutional: Negative for activity change, chills, fatigue and fever  HENT: Negative for congestion  Eyes: Negative for discharge  Respiratory: Negative for cough, chest tightness and shortness of breath  Cardiovascular: Negative for chest pain, palpitations and leg swelling  Gastrointestinal: Negative for abdominal pain  Endocrine: Negative for polydipsia, polyphagia and polyuria  Genitourinary: Negative for difficulty urinating  Musculoskeletal: Negative for arthralgias and myalgias     Skin: Positive for rash ( As per HPI)  Allergic/Immunologic: Negative for immunocompromised state  Neurological: Negative for dizziness, syncope, weakness, light-headedness and headaches  Hematological: Negative for adenopathy  Does not bruise/bleed easily  Psychiatric/Behavioral: Negative for dysphoric mood, sleep disturbance and suicidal ideas  The patient is not nervous/anxious  Objective:  Vitals:    01/29/21 1548   BP: 122/78   Pulse: 87   Temp: 98 4 °F (36 9 °C)   SpO2: 97%   Weight: 54 8 kg (120 lb 12 8 oz)   Height: 5' 2" (1 575 m)     Body mass index is 22 09 kg/m²  Physical Exam  Skin:     General: Skin is warm and dry  Comments:  5 mm brown skin tag  Left axilla  Mildly reddened  Skin tag removal    Date/Time: 1/29/2021 4:47 PM  Performed by: Zeenat Burton PA-C  Authorized by: Zeenat Burton PA-C   Universal Protocol:  Consent: Verbal consent obtained  Risks and benefits: risks, benefits and alternatives were discussed  Consent given by: patient  Patient understanding: patient states understanding of the procedure being performed  Patient identity confirmed: verbally with patient        Procedure Details - Skin Tag Destruction:     Up to 15      Body area:  Trunk    Trunk location:  L axilla    Initial size (mm):  5    Final defect size (mm):  0    Malignancy: benign lesion      Destruction method: electrosurgery    Lesion 6:        Area was read cleaned with Betadine, bacitracin ointment applied, and a Band-Aid        RESULTS:    Recent Results (from the past 1008 hour(s))   Comprehensive metabolic panel    Collection Time: 01/05/21 10:11 AM   Result Value Ref Range    Glucose, Random 90 65 - 99 mg/dL    BUN 14 6 - 24 mg/dL    Creatinine 0 67 0 57 - 1 00 mg/dL    eGFR Non African American 99 >59 mL/min/1 73    eGFR  114 >59 mL/min/1 73    SL AMB BUN/CREATININE RATIO 21 9 - 23    Sodium 142 134 - 144 mmol/L    Potassium 4 6 3 5 - 5 2 mmol/L    Chloride 104 96 - 106 mmol/L    CO2 23 20 - 29 mmol/L    CALCIUM 10 0 8 7 - 10 2 mg/dL    Protein, Total 7 1 6 0 - 8 5 g/dL    Albumin 4 6 3 8 - 4 9 g/dL    Globulin, Total 2 5 1 5 - 4 5 g/dL    Albumin/Globulin Ratio 1 8 1 2 - 2 2    TOTAL BILIRUBIN 0 3 0 0 - 1 2 mg/dL    Alk Phos Isoenzymes 95 39 - 117 IU/L    AST 24 0 - 40 IU/L    ALT 21 0 - 32 IU/L   Lipid panel    Collection Time: 01/05/21 10:11 AM   Result Value Ref Range    Cholesterol, Total 170 100 - 199 mg/dL    Triglycerides 141 0 - 149 mg/dL    HDL 60 >39 mg/dL    VLDL Cholesterol Calculated 24 5 - 40 mg/dL    LDL Calculated 86 0 - 99 mg/dL       This note was created with voice recognition software  Phonic, grammatical and spelling errors may be present within the note as a result

## 2021-07-20 DIAGNOSIS — E78.5 HYPERLIPIDEMIA, UNSPECIFIED HYPERLIPIDEMIA TYPE: ICD-10-CM

## 2021-07-20 RX ORDER — ATORVASTATIN CALCIUM 10 MG/1
TABLET, FILM COATED ORAL
Qty: 90 TABLET | Refills: 1 | Status: SHIPPED | OUTPATIENT
Start: 2021-07-20 | End: 2022-02-07

## 2021-10-15 DIAGNOSIS — J98.01 BRONCHOSPASM: ICD-10-CM

## 2021-10-18 RX ORDER — ALBUTEROL SULFATE 90 UG/1
AEROSOL, METERED RESPIRATORY (INHALATION)
Qty: 8.5 G | Refills: 5 | Status: SHIPPED | OUTPATIENT
Start: 2021-10-18 | End: 2022-05-31 | Stop reason: SDUPTHER

## 2022-02-06 DIAGNOSIS — E78.5 HYPERLIPIDEMIA, UNSPECIFIED HYPERLIPIDEMIA TYPE: ICD-10-CM

## 2022-02-07 RX ORDER — ATORVASTATIN CALCIUM 10 MG/1
TABLET, FILM COATED ORAL
Qty: 30 TABLET | Refills: 0 | Status: SHIPPED | OUTPATIENT
Start: 2022-02-07 | End: 2022-03-10 | Stop reason: SDUPTHER

## 2022-02-07 NOTE — TELEPHONE ENCOUNTER
Patient needs follow-up, has missed last 2 appointments  Has not been seen in over 1 year  Will not be able to provide refills without follow-up

## 2022-03-02 DIAGNOSIS — E78.5 HYPERLIPIDEMIA, UNSPECIFIED HYPERLIPIDEMIA TYPE: ICD-10-CM

## 2022-03-02 RX ORDER — ATORVASTATIN CALCIUM 10 MG/1
TABLET, FILM COATED ORAL
Qty: 30 TABLET | Refills: 0 | OUTPATIENT
Start: 2022-03-02

## 2022-03-10 ENCOUNTER — TELEPHONE (OUTPATIENT)
Dept: INTERNAL MEDICINE CLINIC | Facility: CLINIC | Age: 57
End: 2022-03-10

## 2022-03-10 DIAGNOSIS — E78.5 HYPERLIPIDEMIA, UNSPECIFIED HYPERLIPIDEMIA TYPE: ICD-10-CM

## 2022-03-10 NOTE — TELEPHONE ENCOUNTER
I set her up for an annual physical with vanessa,  Its been a while since last visit  shes asking if he would like to order labs for visit?

## 2022-03-11 RX ORDER — ATORVASTATIN CALCIUM 10 MG/1
10 TABLET, FILM COATED ORAL DAILY
Qty: 30 TABLET | Refills: 0 | Status: SHIPPED | OUTPATIENT
Start: 2022-03-11 | End: 2022-04-04

## 2022-03-18 LAB
25(OH)D3+25(OH)D2 SERPL-MCNC: 44.2 NG/ML (ref 30–100)
ALBUMIN SERPL-MCNC: 4.5 G/DL (ref 3.8–4.9)
ALBUMIN/GLOB SERPL: 2.1 {RATIO} (ref 1.2–2.2)
ALP SERPL-CCNC: 80 IU/L (ref 44–121)
ALT SERPL-CCNC: 17 IU/L (ref 0–32)
AST SERPL-CCNC: 20 IU/L (ref 0–40)
BASOPHILS # BLD AUTO: 0.1 X10E3/UL (ref 0–0.2)
BASOPHILS NFR BLD AUTO: 1 %
BILIRUB SERPL-MCNC: 0.4 MG/DL (ref 0–1.2)
BUN SERPL-MCNC: 13 MG/DL (ref 6–24)
BUN/CREAT SERPL: 25 (ref 9–23)
CALCIUM SERPL-MCNC: 9.7 MG/DL (ref 8.7–10.2)
CHLORIDE SERPL-SCNC: 103 MMOL/L (ref 96–106)
CHOLEST SERPL-MCNC: 160 MG/DL (ref 100–199)
CO2 SERPL-SCNC: 21 MMOL/L (ref 20–29)
CREAT SERPL-MCNC: 0.51 MG/DL (ref 0.57–1)
EGFR: 109 ML/MIN/1.73
EOSINOPHIL # BLD AUTO: 0.5 X10E3/UL (ref 0–0.4)
EOSINOPHIL NFR BLD AUTO: 5 %
ERYTHROCYTE [DISTWIDTH] IN BLOOD BY AUTOMATED COUNT: 13 % (ref 11.7–15.4)
GLOBULIN SER-MCNC: 2.1 G/DL (ref 1.5–4.5)
GLUCOSE SERPL-MCNC: 83 MG/DL (ref 65–99)
HCT VFR BLD AUTO: 41.3 % (ref 34–46.6)
HDLC SERPL-MCNC: 65 MG/DL
HGB BLD-MCNC: 14 G/DL (ref 11.1–15.9)
IMM GRANULOCYTES # BLD: 0 X10E3/UL (ref 0–0.1)
IMM GRANULOCYTES NFR BLD: 0 %
LDLC SERPL CALC-MCNC: 77 MG/DL (ref 0–99)
LYMPHOCYTES # BLD AUTO: 3.9 X10E3/UL (ref 0.7–3.1)
LYMPHOCYTES NFR BLD AUTO: 36 %
MCH RBC QN AUTO: 30 PG (ref 26.6–33)
MCHC RBC AUTO-ENTMCNC: 33.9 G/DL (ref 31.5–35.7)
MCV RBC AUTO: 88 FL (ref 79–97)
MONOCYTES # BLD AUTO: 0.5 X10E3/UL (ref 0.1–0.9)
MONOCYTES NFR BLD AUTO: 5 %
NEUTROPHILS # BLD AUTO: 5.9 X10E3/UL (ref 1.4–7)
NEUTROPHILS NFR BLD AUTO: 53 %
PLATELET # BLD AUTO: 283 X10E3/UL (ref 150–450)
POTASSIUM SERPL-SCNC: 4.1 MMOL/L (ref 3.5–5.2)
PROT SERPL-MCNC: 6.6 G/DL (ref 6–8.5)
RBC # BLD AUTO: 4.67 X10E6/UL (ref 3.77–5.28)
SL AMB VLDL CHOLESTEROL CALC: 18 MG/DL (ref 5–40)
SODIUM SERPL-SCNC: 142 MMOL/L (ref 134–144)
TRIGL SERPL-MCNC: 96 MG/DL (ref 0–149)
WBC # BLD AUTO: 10.9 X10E3/UL (ref 3.4–10.8)

## 2022-03-23 ENCOUNTER — OFFICE VISIT (OUTPATIENT)
Dept: INTERNAL MEDICINE CLINIC | Facility: CLINIC | Age: 57
End: 2022-03-23
Payer: COMMERCIAL

## 2022-03-23 VITALS
SYSTOLIC BLOOD PRESSURE: 118 MMHG | WEIGHT: 124 LBS | HEIGHT: 62 IN | OXYGEN SATURATION: 97 % | TEMPERATURE: 98 F | BODY MASS INDEX: 22.82 KG/M2 | HEART RATE: 87 BPM | DIASTOLIC BLOOD PRESSURE: 78 MMHG

## 2022-03-23 DIAGNOSIS — Z12.31 ENCOUNTER FOR SCREENING MAMMOGRAM FOR MALIGNANT NEOPLASM OF BREAST: ICD-10-CM

## 2022-03-23 DIAGNOSIS — K58.9 IRRITABLE BOWEL SYNDROME, UNSPECIFIED TYPE: ICD-10-CM

## 2022-03-23 DIAGNOSIS — E55.9 VITAMIN D DEFICIENCY: ICD-10-CM

## 2022-03-23 DIAGNOSIS — E78.2 MIXED HYPERLIPIDEMIA: ICD-10-CM

## 2022-03-23 DIAGNOSIS — Z00.00 ANNUAL PHYSICAL EXAM: Primary | ICD-10-CM

## 2022-03-23 PROCEDURE — 3725F SCREEN DEPRESSION PERFORMED: CPT | Performed by: PHYSICIAN ASSISTANT

## 2022-03-23 PROCEDURE — 99396 PREV VISIT EST AGE 40-64: CPT | Performed by: PHYSICIAN ASSISTANT

## 2022-03-23 PROCEDURE — 3008F BODY MASS INDEX DOCD: CPT | Performed by: PHYSICIAN ASSISTANT

## 2022-03-23 PROCEDURE — 4004F PT TOBACCO SCREEN RCVD TLK: CPT | Performed by: PHYSICIAN ASSISTANT

## 2022-03-23 RX ORDER — SACCHAROMYCES BOULARDII 250 MG
250 CAPSULE ORAL DAILY
Qty: 90 CAPSULE | Refills: 1 | Status: CANCELLED | OUTPATIENT
Start: 2022-03-23

## 2022-03-23 NOTE — PATIENT INSTRUCTIONS
General medical exam is good  Cholesterol is well controlled  Vitamin-D level is now in normal range  Continue both medications  Will schedule follow-up in 1 year with repeat labs for that visit, sooner as needed

## 2022-03-23 NOTE — PROGRESS NOTES
Assessment/Plan:   Patient Instructions     General medical exam is good  Cholesterol is well controlled  Vitamin-D level is now in normal range  Continue both medications  Will schedule follow-up in 1 year with repeat labs for that visit, sooner as needed  Quality Measures: Tobacco Cessation Counseling: Tobacco cessation counseling was provided  The patient is sincerely urged to quit consumption of tobacco  She is not ready to quit tobacco         Return in about 1 year (around 3/23/2023) for Annual physical          Diagnoses and all orders for this visit:    Annual physical exam    Mixed hyperlipidemia  -     Comprehensive metabolic panel; Future  -     Lipid panel; Future    Vitamin D deficiency    Irritable bowel syndrome, unspecified type  -     CBC and differential; Future          Subjective:      Patient ID: Raoul Farias is a 64 y o  female  66-year-old female presents for her annual physical   She has miss follow-up secondary to [place] pandemic and losing her job  She has since found a new job which she likes very much  She is also immunized against COVID-19  Hyperlipidemia:  Very good control on recent labs  Denies side effects of atorvastatin  Vitamin-D deficiency:  D level now in normal range  Patient continues to take vitamin-D daily  Asymptomatic  Other labs reviewed her acceptable  Patient reports she has had significant dental work over the past year  Will be going for mammogram     Smoking cessation again discussed  EMR has been reviewed, clarified, and updated with patient today        ALLERGIES:  No Known Allergies    CURRENT MEDICATIONS:    Current Outpatient Medications:     albuterol (PROVENTIL HFA,VENTOLIN HFA) 90 mcg/act inhaler, TAKE 2 PUFFS BY MOUTH EVERY 6 HOURS AS NEEDED FOR WHEEZE, Disp: 8 5 g, Rfl: 5    atorvastatin (LIPITOR) 10 mg tablet, Take 1 tablet (10 mg total) by mouth daily, Disp: 30 tablet, Rfl: 0    ACTIVE PROBLEM LIST:  Patient Active Problem List   Diagnosis    Lumbar facet joint syndrome    Hyperlipidemia    Vitamin D deficiency       PAST MEDICAL HISTORY:  History reviewed  No pertinent past medical history  PAST SURGICAL HISTORY:  Past Surgical History:   Procedure Laterality Date    NASAL FRACTURE SURGERY      OVARIAN CYST REMOVAL      RADIOFREQUENCY ABLATION      UTERINE FIBROID SURGERY         FAMILY HISTORY:  Family History   Problem Relation Age of Onset    Uterine cancer Mother     Diabetes Mother     Coronary artery disease Father     Lung cancer Father     Uterine cancer Sister     Hyperlipidemia Brother     Coronary artery disease Maternal Grandmother     Cancer Maternal Grandfather     Coronary artery disease Paternal Grandfather        SOCIAL HISTORY:  Social History     Socioeconomic History    Marital status: Single     Spouse name: Not on file    Number of children: Not on file    Years of education: Not on file    Highest education level: Not on file   Occupational History    Not on file   Tobacco Use    Smoking status: Current Every Day Smoker     Packs/day: 0 50     Years: 30 00     Pack years: 15 00    Smokeless tobacco: Never Used   Substance and Sexual Activity    Alcohol use: Yes     Comment: 1 drink/week    Drug use: Never    Sexual activity: Yes     Partners: Male   Other Topics Concern    Not on file   Social History Narrative        No children        Hobbies-horseback riding    Regular dental care, brushes twice a day     Social Determinants of Health     Financial Resource Strain: Not on file   Food Insecurity: Not on file   Transportation Needs: Not on file   Physical Activity: Not on file   Stress: Not on file   Social Connections: Not on file   Intimate Partner Violence: Not on file   Housing Stability: Not on file       Review of Systems   Constitutional: Negative for activity change, chills, fatigue and fever  HENT: Negative for congestion  Eyes: Negative for discharge  Respiratory: Negative for cough, chest tightness and shortness of breath  Cardiovascular: Negative for chest pain, palpitations and leg swelling  Gastrointestinal: Negative for abdominal pain, blood in stool, constipation, diarrhea, nausea and vomiting  Endocrine: Negative for polydipsia, polyphagia and polyuria  Genitourinary: Negative for difficulty urinating  Musculoskeletal: Negative for arthralgias and myalgias  Skin: Negative for rash  Allergic/Immunologic: Negative for immunocompromised state  Neurological: Negative for dizziness, syncope, weakness, light-headedness and headaches  Hematological: Negative for adenopathy  Does not bruise/bleed easily  Psychiatric/Behavioral: Negative for dysphoric mood, sleep disturbance and suicidal ideas  The patient is not nervous/anxious  Objective:  Vitals:    03/23/22 0800   BP: 118/78   BP Location: Right arm   Patient Position: Sitting   Cuff Size: Adult   Pulse: 87   Temp: 98 °F (36 7 °C)   TempSrc: Temporal   SpO2: 97%   Weight: 56 2 kg (124 lb)   Height: 5' 2" (1 575 m)     Body mass index is 22 68 kg/m²  Physical Exam  Vitals and nursing note reviewed  Constitutional:       General: She is not in acute distress  Appearance: Normal appearance  She is well-developed  She is not ill-appearing  HENT:      Head: Normocephalic and atraumatic  Right Ear: Tympanic membrane, ear canal and external ear normal       Left Ear: Tympanic membrane, ear canal and external ear normal       Nose: Nose normal       Mouth/Throat:      Mouth: Mucous membranes are moist       Pharynx: Oropharynx is clear  Eyes:      General: No scleral icterus  Right eye: No discharge  Left eye: No discharge  Conjunctiva/sclera: Conjunctivae normal       Pupils: Pupils are equal, round, and reactive to light  Neck:      Thyroid: No thyromegaly  Vascular: No carotid bruit     Cardiovascular: Rate and Rhythm: Normal rate and regular rhythm  Pulses: Normal pulses  Heart sounds: Normal heart sounds  No murmur heard  Pulmonary:      Effort: Pulmonary effort is normal  No respiratory distress  Breath sounds: Normal breath sounds  Chest:      Chest wall: No tenderness  Abdominal:      General: Abdomen is flat  Bowel sounds are normal  There is no distension  Palpations: Abdomen is soft  There is no hepatomegaly, splenomegaly or mass  Tenderness: There is no abdominal tenderness  There is no right CVA tenderness, left CVA tenderness, guarding or rebound  Musculoskeletal:         General: No swelling  Normal range of motion  Cervical back: Normal range of motion and neck supple  Right lower leg: No edema  Left lower leg: No edema  Lymphadenopathy:      Cervical: No cervical adenopathy  Skin:     General: Skin is warm and dry  Findings: No rash  Neurological:      General: No focal deficit present  Mental Status: She is alert and oriented to person, place, and time  Cranial Nerves: No cranial nerve deficit  Sensory: No sensory deficit  Motor: No weakness  Coordination: Coordination normal       Gait: Gait normal       Deep Tendon Reflexes: Reflexes are normal and symmetric  Reflexes normal    Psychiatric:         Mood and Affect: Mood normal          Behavior: Behavior normal          Thought Content:  Thought content normal          Judgment: Judgment normal            RESULTS:    Recent Results (from the past 1008 hour(s))   CBC and differential    Collection Time: 03/17/22 12:00 PM   Result Value Ref Range    White Blood Cell Count 10 9 (H) 3 4 - 10 8 x10E3/uL    Red Blood Cell Count 4 67 3 77 - 5 28 x10E6/uL    Hemoglobin 14 0 11 1 - 15 9 g/dL    HCT 41 3 34 0 - 46 6 %    MCV 88 79 - 97 fL    MCH 30 0 26 6 - 33 0 pg    MCHC 33 9 31 5 - 35 7 g/dL    RDW 13 0 11 7 - 15 4 %    Platelet Count 994 954 - 450 x10E3/uL Neutrophils 53 Not Estab  %    Lymphocytes 36 Not Estab  %    Monocytes 5 Not Estab  %    Eosinophils 5 Not Estab  %    Basophils PCT 1 Not Estab  %    Neutrophils (Absolute) 5 9 1 4 - 7 0 x10E3/uL    Lymphocytes (Absolute) 3 9 (H) 0 7 - 3 1 x10E3/uL    Monocytes (Absolute) 0 5 0 1 - 0 9 x10E3/uL    Eosinophils (Absolute) 0 5 (H) 0 0 - 0 4 x10E3/uL    Basophils ABS 0 1 0 0 - 0 2 x10E3/uL    Immature Granulocytes 0 Not Estab  %    Immature Granulocytes (Absolute) 0 0 0 0 - 0 1 x10E3/uL   Comprehensive metabolic panel    Collection Time: 03/17/22 12:00 PM   Result Value Ref Range    Glucose, Random 83 65 - 99 mg/dL    BUN 13 6 - 24 mg/dL    Creatinine 0 51 (L) 0 57 - 1 00 mg/dL    eGFR 109 >59 mL/min/1 73    SL AMB BUN/CREATININE RATIO 25 (H) 9 - 23    Sodium 142 134 - 144 mmol/L    Potassium 4 1 3 5 - 5 2 mmol/L    Chloride 103 96 - 106 mmol/L    CO2 21 20 - 29 mmol/L    CALCIUM 9 7 8 7 - 10 2 mg/dL    Protein, Total 6 6 6 0 - 8 5 g/dL    Albumin 4 5 3 8 - 4 9 g/dL    Globulin, Total 2 1 1 5 - 4 5 g/dL    Albumin/Globulin Ratio 2 1 1 2 - 2 2    TOTAL BILIRUBIN 0 4 0 0 - 1 2 mg/dL    Alk Phos Isoenzymes 80 44 - 121 IU/L    AST 20 0 - 40 IU/L    ALT 17 0 - 32 IU/L   Lipid panel    Collection Time: 03/17/22 12:00 PM   Result Value Ref Range    Cholesterol, Total 160 100 - 199 mg/dL    Triglycerides 96 0 - 149 mg/dL    HDL 65 >39 mg/dL    VLDL Cholesterol Calculated 18 5 - 40 mg/dL    LDL Calculated 77 0 - 99 mg/dL   Vitamin D 25 hydroxy    Collection Time: 03/17/22 12:00 PM   Result Value Ref Range    25-HYDROXY VIT D 44 2 30 0 - 100 0 ng/mL       This note was created with voice recognition software  Phonic, grammatical and spelling errors may be present within the note as a result

## 2022-05-31 ENCOUNTER — OFFICE VISIT (OUTPATIENT)
Dept: INTERNAL MEDICINE CLINIC | Facility: CLINIC | Age: 57
End: 2022-05-31
Payer: COMMERCIAL

## 2022-05-31 VITALS
OXYGEN SATURATION: 97 % | HEART RATE: 90 BPM | TEMPERATURE: 100 F | BODY MASS INDEX: 22.82 KG/M2 | RESPIRATION RATE: 16 BRPM | HEIGHT: 62 IN | WEIGHT: 124 LBS | SYSTOLIC BLOOD PRESSURE: 110 MMHG | DIASTOLIC BLOOD PRESSURE: 72 MMHG

## 2022-05-31 DIAGNOSIS — J98.01 BRONCHOSPASM: ICD-10-CM

## 2022-05-31 DIAGNOSIS — J20.9 ACUTE BRONCHITIS WITH BRONCHOSPASM: Primary | ICD-10-CM

## 2022-05-31 DIAGNOSIS — J01.40 ACUTE NON-RECURRENT PANSINUSITIS: ICD-10-CM

## 2022-05-31 PROCEDURE — U0003 INFECTIOUS AGENT DETECTION BY NUCLEIC ACID (DNA OR RNA); SEVERE ACUTE RESPIRATORY SYNDROME CORONAVIRUS 2 (SARS-COV-2) (CORONAVIRUS DISEASE [COVID-19]), AMPLIFIED PROBE TECHNIQUE, MAKING USE OF HIGH THROUGHPUT TECHNOLOGIES AS DESCRIBED BY CMS-2020-01-R: HCPCS | Performed by: PHYSICIAN ASSISTANT

## 2022-05-31 PROCEDURE — 3008F BODY MASS INDEX DOCD: CPT | Performed by: PHYSICIAN ASSISTANT

## 2022-05-31 PROCEDURE — 99213 OFFICE O/P EST LOW 20 MIN: CPT | Performed by: PHYSICIAN ASSISTANT

## 2022-05-31 PROCEDURE — 3725F SCREEN DEPRESSION PERFORMED: CPT | Performed by: PHYSICIAN ASSISTANT

## 2022-05-31 PROCEDURE — U0005 INFEC AGEN DETEC AMPLI PROBE: HCPCS | Performed by: PHYSICIAN ASSISTANT

## 2022-05-31 PROCEDURE — 4004F PT TOBACCO SCREEN RCVD TLK: CPT | Performed by: PHYSICIAN ASSISTANT

## 2022-05-31 RX ORDER — ALBUTEROL SULFATE 90 UG/1
2 AEROSOL, METERED RESPIRATORY (INHALATION) EVERY 6 HOURS PRN
Qty: 8.5 G | Refills: 5 | Status: SHIPPED | OUTPATIENT
Start: 2022-05-31

## 2022-05-31 RX ORDER — METHYLPREDNISOLONE 4 MG/1
TABLET ORAL
Qty: 21 TABLET | Refills: 0 | Status: SHIPPED | OUTPATIENT
Start: 2022-05-31

## 2022-05-31 RX ORDER — CEFUROXIME AXETIL 500 MG/1
500 TABLET ORAL EVERY 12 HOURS SCHEDULED
Qty: 20 TABLET | Refills: 0 | Status: SHIPPED | OUTPATIENT
Start: 2022-05-31 | End: 2022-06-10

## 2022-05-31 NOTE — PATIENT INSTRUCTIONS
Start Medrol pack, and use albuterol inhaler on a regular basis  Await results of COVID testing before possibly starting antibiotic  Rest, increase fluids  Tylenol for fever or aches as per package instructions  Always wise to take a probiotic and or eat yogurt daily when on an antibiotic    Follow-up if not steadily improving

## 2022-05-31 NOTE — PROGRESS NOTES
Assessment/Plan:   Patient Instructions     Start Medrol pack, and use albuterol inhaler on a regular basis  Await results of COVID testing before possibly starting antibiotic  Rest, increase fluids  Tylenol for fever or aches as per package instructions  Always wise to take a probiotic and or eat yogurt daily when on an antibiotic  Follow-up if not steadily improving        Quality Measures:   Depression Screening and Follow-up Plan: Patient was screened for depression during today's encounter  They screened negative with a PHQ-2 score of 0  No follow-ups on file  Diagnoses and all orders for this visit:    Acute bronchitis with bronchospasm  -     COVID Only - Office Collect  -     methylPREDNISolone 4 MG tablet therapy pack; Use as directed on package  -     cefuroxime (CEFTIN) 500 mg tablet; Take 1 tablet (500 mg total) by mouth every 12 (twelve) hours for 10 days    Acute non-recurrent pansinusitis  -     COVID Only - Office Collect  -     cefuroxime (CEFTIN) 500 mg tablet; Take 1 tablet (500 mg total) by mouth every 12 (twelve) hours for 10 days    Bronchospasm  -     albuterol (PROVENTIL HFA,VENTOLIN HFA) 90 mcg/act inhaler; Inhale 2 puffs every 6 (six) hours as needed for wheezing          Subjective:      Patient ID: Eh Aguillon is a 62 y o  female  Acute visit    1 week ago started with symptoms of sore throat, followed by head congestion, followed by body aches and fatigue  Early last week did 1 COVID test which was negative  Symptoms have continued despite her taking amoxicillin that she had "left over" from a dental procedure  This also has not provided any relief  Using Claritin and Flonase for allergies, and OTC Robitussin DM without any improvement  Also using her albuterol inhaler because of wheezing  Coughing is generally worse at night and 1st thing in the morning  No documented fever  No chills or diarrhea        ALLERGIES:  No Known Allergies    CURRENT MEDICATIONS:    Current Outpatient Medications:     albuterol (PROVENTIL HFA,VENTOLIN HFA) 90 mcg/act inhaler, Inhale 2 puffs every 6 (six) hours as needed for wheezing, Disp: 8 5 g, Rfl: 5    atorvastatin (LIPITOR) 10 mg tablet, TAKE 1 TABLET BY MOUTH EVERY DAY, Disp: 90 tablet, Rfl: 1    cefuroxime (CEFTIN) 500 mg tablet, Take 1 tablet (500 mg total) by mouth every 12 (twelve) hours for 10 days, Disp: 20 tablet, Rfl: 0    methylPREDNISolone 4 MG tablet therapy pack, Use as directed on package, Disp: 21 tablet, Rfl: 0    ACTIVE PROBLEM LIST:  Patient Active Problem List   Diagnosis    Lumbar facet joint syndrome    Hyperlipidemia    Vitamin D deficiency       PAST MEDICAL HISTORY:  No past medical history on file      PAST SURGICAL HISTORY:  Past Surgical History:   Procedure Laterality Date    NASAL FRACTURE SURGERY      OVARIAN CYST REMOVAL      RADIOFREQUENCY ABLATION      UTERINE FIBROID SURGERY         FAMILY HISTORY:  Family History   Problem Relation Age of Onset    Uterine cancer Mother     Diabetes Mother     Coronary artery disease Father     Lung cancer Father     Uterine cancer Sister     Hyperlipidemia Brother     Coronary artery disease Maternal Grandmother     Cancer Maternal Grandfather     Coronary artery disease Paternal Grandfather        SOCIAL HISTORY:  Social History     Socioeconomic History    Marital status: Single     Spouse name: Not on file    Number of children: Not on file    Years of education: Not on file    Highest education level: Not on file   Occupational History    Not on file   Tobacco Use    Smoking status: Current Every Day Smoker     Packs/day: 0 50     Years: 30 00     Pack years: 15 00    Smokeless tobacco: Never Used   Substance and Sexual Activity    Alcohol use: Yes     Comment: 1 drink/week    Drug use: Never    Sexual activity: Yes     Partners: Male   Other Topics Concern    Not on file   Social History Narrative        No children        Hobbies-horseback riding    Regular dental care, brushes twice a day     Social Determinants of Health     Financial Resource Strain: Not on file   Food Insecurity: Not on file   Transportation Needs: Not on file   Physical Activity: Not on file   Stress: Not on file   Social Connections: Not on file   Intimate Partner Violence: Not on file   Housing Stability: Not on file       Review of Systems   Constitutional: Positive for fatigue  Negative for activity change, chills and fever  HENT: Positive for congestion, postnasal drip, rhinorrhea, sinus pressure, sinus pain and sore throat  Negative for ear pain, sneezing and voice change  Eyes: Negative for discharge, redness, itching and visual disturbance  Respiratory: Positive for cough, chest tightness, shortness of breath and wheezing  Cardiovascular: Negative for chest pain, palpitations and leg swelling  Gastrointestinal: Negative for abdominal pain, constipation, diarrhea, nausea and vomiting  Endocrine: Negative for polydipsia, polyphagia and polyuria  Genitourinary: Negative for difficulty urinating  Musculoskeletal: Negative for arthralgias and myalgias  Skin: Negative for rash  Allergic/Immunologic: Negative for immunocompromised state  Neurological: Negative for dizziness, syncope, weakness, light-headedness and headaches  Hematological: Negative for adenopathy  Does not bruise/bleed easily  Psychiatric/Behavioral: Positive for sleep disturbance (  Secondary to coughing)  Negative for dysphoric mood and suicidal ideas  The patient is not nervous/anxious  Objective:  Vitals:    05/31/22 1300   BP: 110/72   BP Location: Right arm   Patient Position: Sitting   Cuff Size: Standard   Pulse: 90   Resp: 16   Temp: 100 °F (37 8 °C)   TempSrc: Tympanic   SpO2: 97%   Weight: 56 2 kg (124 lb)   Height: 5' 2" (1 575 m)     Body mass index is 22 68 kg/m²       Physical Exam  Vitals and nursing note reviewed  Constitutional:       General: She is not in acute distress  Appearance: She is well-developed  She is not ill-appearing  HENT:      Head: Normocephalic and atraumatic  Comments:  HEENT-injected conjunctivae, TMs dull with fluid behind, nasal mucosa hyperemic with yellow/green mucoid drainage, injected red posterior pharynx with yellow/green post nasal drainage, tender maxillary sinus region  Eyes:      Extraocular Movements: Extraocular movements intact  Pupils: Pupils are equal, round, and reactive to light  Neck:      Thyroid: No thyromegaly  Vascular: No carotid bruit or JVD  Cardiovascular:      Rate and Rhythm: Normal rate and regular rhythm  Heart sounds: Normal heart sounds  Pulmonary:      Effort: Pulmonary effort is normal  No respiratory distress  Comments:  Late inspiratory wheezing and scattered rhonchi  Expiratory wheezing and rhonchi throughout all lung fields  Forced expiration induces coughing spasms  Musculoskeletal:      Cervical back: Neck supple  Right lower leg: No edema  Left lower leg: No edema  Lymphadenopathy:      Cervical: No cervical adenopathy  Skin:     General: Skin is warm and dry  Findings: No rash  Neurological:      General: No focal deficit present  Mental Status: She is alert and oriented to person, place, and time  Mental status is at baseline  Psychiatric:         Mood and Affect: Mood normal          Behavior: Behavior normal            RESULTS:    No results found for this or any previous visit (from the past 1008 hour(s))  This note was created with voice recognition software  Phonic, grammatical and spelling errors may be present within the note as a result

## 2022-06-01 LAB — SARS-COV-2 RNA RESP QL NAA+PROBE: NEGATIVE

## 2022-07-12 ENCOUNTER — VBI (OUTPATIENT)
Dept: ADMINISTRATIVE | Facility: OTHER | Age: 57
End: 2022-07-12

## 2022-07-12 NOTE — TELEPHONE ENCOUNTER
07/12/22 2:56 PM     See documentation in the VB [employer] SmartForm       [facility] [employer]

## 2022-09-29 DIAGNOSIS — E78.5 HYPERLIPIDEMIA, UNSPECIFIED HYPERLIPIDEMIA TYPE: ICD-10-CM

## 2022-09-29 RX ORDER — ATORVASTATIN CALCIUM 10 MG/1
TABLET, FILM COATED ORAL
Qty: 30 TABLET | Refills: 5 | Status: SHIPPED | OUTPATIENT
Start: 2022-09-29

## 2022-10-30 DIAGNOSIS — E78.5 HYPERLIPIDEMIA, UNSPECIFIED HYPERLIPIDEMIA TYPE: ICD-10-CM

## 2022-10-31 RX ORDER — ATORVASTATIN CALCIUM 10 MG/1
TABLET, FILM COATED ORAL
Qty: 90 TABLET | Refills: 2 | Status: SHIPPED | OUTPATIENT
Start: 2022-10-31

## 2022-11-11 ENCOUNTER — VBI (OUTPATIENT)
Dept: ADMINISTRATIVE | Facility: OTHER | Age: 57
End: 2022-11-11

## 2022-11-14 ENCOUNTER — TELEPHONE (OUTPATIENT)
Dept: ADMINISTRATIVE | Facility: OTHER | Age: 57
End: 2022-11-14

## 2022-11-14 NOTE — TELEPHONE ENCOUNTER
----- Message from Bernardo Aden sent at 11/11/2022  7:41 AM EST -----  Regarding: mammo/dexa  11/11/22 7:41 AM    Hello, our patient attached above has had Mammogram completed/performed  Please assist in updating the patient chart by pulling the Care Everywhere (CE) document  The date of service is 11/10/22  Thank you,  Cady Amador  [facility]       11/11/22 7:42 AM    Hello, our patient Alex Moralez has had DEXA Scan completed/performed  Please assist in updating the patient chart by pulling the Care Everywhere (CE) document  The date of service is 11/10/22       Thank you,  Cady Amador MA   INTERNAL [facility] [place]

## 2022-11-22 NOTE — TELEPHONE ENCOUNTER
Upon review of the In Basket request we were able to locate, review, and update the patient chart as requested for DEXA Scan and Mammogram     Any additional questions or concerns should be emailed to the Practice Liaisons via the appropriate education email address, please do not reply via In Basket      Thank you  Peter Marsh MA

## 2023-06-22 ENCOUNTER — OFFICE VISIT (OUTPATIENT)
Dept: INTERNAL MEDICINE CLINIC | Facility: CLINIC | Age: 58
End: 2023-06-22
Payer: COMMERCIAL

## 2023-06-22 VITALS
DIASTOLIC BLOOD PRESSURE: 70 MMHG | HEIGHT: 62 IN | HEART RATE: 75 BPM | BODY MASS INDEX: 22.08 KG/M2 | OXYGEN SATURATION: 98 % | SYSTOLIC BLOOD PRESSURE: 110 MMHG | WEIGHT: 120 LBS

## 2023-06-22 DIAGNOSIS — J98.01 BRONCHOSPASM: ICD-10-CM

## 2023-06-22 DIAGNOSIS — R07.9 CHEST PAIN, UNSPECIFIED TYPE: ICD-10-CM

## 2023-06-22 DIAGNOSIS — R94.31 ABNORMAL EKG: ICD-10-CM

## 2023-06-22 DIAGNOSIS — E78.2 MIXED HYPERLIPIDEMIA: ICD-10-CM

## 2023-06-22 DIAGNOSIS — Z00.00 ANNUAL PHYSICAL EXAM: Primary | ICD-10-CM

## 2023-06-22 DIAGNOSIS — Z72.0 TOBACCO ABUSE: ICD-10-CM

## 2023-06-22 DIAGNOSIS — R53.83 FATIGUE, UNSPECIFIED TYPE: ICD-10-CM

## 2023-06-22 DIAGNOSIS — J30.9 ALLERGIC RHINITIS, UNSPECIFIED SEASONALITY, UNSPECIFIED TRIGGER: ICD-10-CM

## 2023-06-22 PROCEDURE — 99396 PREV VISIT EST AGE 40-64: CPT | Performed by: PHYSICIAN ASSISTANT

## 2023-06-22 PROCEDURE — 93000 ELECTROCARDIOGRAM COMPLETE: CPT | Performed by: PHYSICIAN ASSISTANT

## 2023-06-22 RX ORDER — AZELASTINE 1 MG/ML
1 SPRAY, METERED NASAL 2 TIMES DAILY
Qty: 30 ML | Refills: 3 | Status: SHIPPED | OUTPATIENT
Start: 2023-06-22

## 2023-06-22 RX ORDER — NICOTINE 21 MG/24HR
1 PATCH, TRANSDERMAL 24 HOURS TRANSDERMAL EVERY 24 HOURS
Qty: 28 PATCH | Refills: 0 | Status: SHIPPED | OUTPATIENT
Start: 2023-06-22

## 2023-06-22 RX ORDER — ALBUTEROL SULFATE 90 UG/1
2 AEROSOL, METERED RESPIRATORY (INHALATION) EVERY 6 HOURS PRN
Qty: 8.5 G | Refills: 5 | Status: SHIPPED | OUTPATIENT
Start: 2023-06-22

## 2023-06-22 NOTE — PATIENT INSTRUCTIONS
General medical exam is good  Start nicotine patch 21 mg, apply 1 daily for 28 days, remove at night  For cravings you can either chew Nicorette gum or use Nicorette lozenges  After 28 days start nicotine patch 21 mg 1 daily for 28 days, again using Nicorette gum or lozenges for breakthrough cravings  After 28 days start nicotine patch 7 mg for 28 days and again using Nicorette gum or lozenges for breakthrough craving  Give trial of Astelin nasal spray 1 spray each nostril twice daily as demonstrated and instructed  Remember it will take a few days to become effective  If not completely effective you can add Flonase to the Astelin  Again administering it as demonstrated and instructed  Have labs done and I will discuss the results with you when available  Please keep me informed of any crease in frequency, duration, or intensity of the chest pain  Because of family history, tobacco abuse, hyperlipidemia, postmenopausal, and abnormal ECG I am going to ask for nuclear stress testing and echocardiogram   Patient was contacted at home about the additional orders  Wellness Visit for Adults   AMBULATORY CARE:   A wellness visit  is when you see your healthcare provider to get screened for health problems  Your healthcare provider will also give you advice on how to stay healthy  Write down your questions so you remember to ask them  Ask your healthcare provider how often you should have a wellness visit  What happens at a wellness visit:  Your healthcare provider will ask about your health, and your family history of health problems  This includes high blood pressure, heart disease, and cancer  He or she will ask if you have symptoms that concern you, if you smoke, and about your mood  You may also be asked about your intake of medicines, supplements, food, and alcohol  Any of the following may be done: Your weight  will be checked   Your height may also be checked so your body mass index (BMI) can be calculated  Your BMI shows if you are at a healthy weight  Your blood pressure  and heart rate will be checked  Your temperature may also be checked  Blood and urine tests  may be done  Blood tests may be done to check your cholesterol levels  Abnormal cholesterol levels increase your risk for heart disease and stroke  You may also need a blood or urine test to check for diabetes if you are at increased risk  Urine tests may be done to look for signs of an infection or kidney disease  A physical exam  includes checking your heartbeat and lungs with a stethoscope  Your healthcare provider may also check your skin to look for sun damage  Screening tests  may be recommended  A screening test is done to check for diseases that may not cause symptoms  The screening tests you may need depend on your age, gender, family history, and lifestyle habits  For example, colorectal screening may be recommended if you are 48years old or older  Screening tests you need if you are a woman:   A Pap smear  is used to screen for cervical cancer  Pap smears are usually done every 3 to 5 years depending on your age  You may need them more often if you have had abnormal Pap smear test results in the past  Ask your healthcare provider how often you should have a Pap smear  A mammogram  is an x-ray of your breasts to screen for breast cancer  Experts recommend mammograms every 2 years starting at age 48 years  You may need a mammogram at age 52 years or younger if you have an increased risk for breast cancer  Talk to your healthcare provider about when you should start having mammograms and how often you need them  Vaccines you may need:   Get an influenza vaccine  every year  The influenza vaccine protects you from the flu  Several types of viruses cause the flu  The viruses change over time, so new vaccines are made each year  Get a tetanus-diphtheria (Td) booster vaccine  every 10 years   This vaccine protects you against tetanus and diphtheria  Tetanus is a severe infection that may cause painful muscle spasms and lockjaw  Diphtheria is a severe bacterial infection that causes a thick covering in the back of your mouth and throat  Get a human papillomavirus (HPV) vaccine  if you are female and aged 23 to 32 or male 23 to 24 and never received it  This vaccine protects you from HPV infection  HPV is the most common infection spread by sexual contact  HPV may also cause vaginal, penile, and anal cancers  Get a pneumococcal vaccine  if you are aged 72 years or older  The pneumococcal vaccine is an injection given to protect you from pneumococcal disease  Pneumococcal disease is an infection caused by pneumococcal bacteria  The infection may cause pneumonia, meningitis, or an ear infection  Get a shingles vaccine  if you are 60 or older, even if you have had shingles before  The shingles vaccine is an injection to protect you from the varicella-zoster virus  This is the same virus that causes chickenpox  Shingles is a painful rash that develops in people who had chickenpox or have been exposed to the virus  How to eat healthy:  My Plate is a model for planning healthy meals  It shows the types and amounts of foods that should go on your plate  Fruits and vegetables make up about half of your plate, and grains and protein make up the other half  A serving of dairy is included on the side of your plate  The amount of calories and serving sizes you need depends on your age, gender, weight, and height  Examples of healthy foods are listed below:  Eat a variety of vegetables  such as dark green, red, and orange vegetables  You can also include canned vegetables low in sodium (salt) and frozen vegetables without added butter or sauces  Eat a variety of fresh fruits , canned fruit in 100% juice, frozen fruit, and dried fruit  Include whole grains  At least half of the grains you eat should be whole grains   Examples include whole-wheat bread, wheat pasta, brown rice, and whole-grain cereals such as oatmeal     Eat a variety of protein foods such as seafood (fish and shellfish), lean meat, and poultry without skin (turkey and chicken)  Examples of lean meats include pork leg, shoulder, or tenderloin, and beef round, sirloin, tenderloin, and extra lean ground beef  Other protein foods include eggs and egg substitutes, beans, peas, soy products, nuts, and seeds  Choose low-fat dairy products such as skim or 1% milk or low-fat yogurt, cheese, and cottage cheese  Limit unhealthy fats  such as butter, hard margarine, and shortening  Exercise:  Exercise at least 30 minutes per day on most days of the week  Some examples of exercise include walking, biking, dancing, and swimming  You can also fit in more physical activity by taking the stairs instead of the elevator or parking farther away from stores  Include muscle strengthening activities 2 days each week  Regular exercise provides many health benefits  It helps you manage your weight, and decreases your risk for type 2 diabetes, heart disease, stroke, and high blood pressure  Exercise can also help improve your mood  Ask your healthcare provider about the best exercise plan for you  General health and safety guidelines:   Do not smoke  Nicotine and other chemicals in cigarettes and cigars can cause lung damage  Ask your healthcare provider for information if you currently smoke and need help to quit  E-cigarettes or smokeless tobacco still contain nicotine  Talk to your healthcare provider before you use these products  Limit alcohol  A drink of alcohol is 12 ounces of beer, 5 ounces of wine, or 1½ ounces of liquor  Lose weight, if needed  Being overweight increases your risk of certain health conditions  These include heart disease, high blood pressure, type 2 diabetes, and certain types of cancer  Protect your skin    Do not sunbathe or use tanning beds  Use sunscreen with a SPF 15 or higher  Apply sunscreen at least 15 minutes before you go outside  Reapply sunscreen every 2 hours  Wear protective clothing, hats, and sunglasses when you are outside  Drive safely  Always wear your seatbelt  Make sure everyone in your car wears a seatbelt  A seatbelt can save your life if you are in an accident  Do not use your cell phone when you are driving  This could distract you and cause an accident  Pull over if you need to make a call or send a text message  Practice safe sex  Use latex condoms if are sexually active and have more than one partner  Your healthcare provider may recommend screening tests for sexually transmitted infections (STIs)  Wear helmets, lifejackets, and protective gear  Always wear a helmet when you ride a bike or motorcycle, go skiing, or play sports that could cause a head injury  Wear protective equipment when you play sports  Wear a lifejacket when you are on a boat or doing water sports  © Copyright [employer] 2022 Information is for End User's use only and may not be sold, redistributed or otherwise used for commercial purposes  The above information is an  only  It is not intended as medical advice for individual conditions or treatments  Talk to your doctor, nurse or pharmacist before following any medical regimen to see if it is safe and effective for you

## 2023-06-22 NOTE — PROGRESS NOTES
Assessment/Plan:   Patient Instructions   General medical exam is good  Start nicotine patch 21 mg, apply 1 daily for 28 days, remove at night  For cravings you can either chew Nicorette gum or use Nicorette lozenges  After 28 days start nicotine patch 21 mg 1 daily for 28 days, again using Nicorette gum or lozenges for breakthrough cravings  After 28 days start nicotine patch 7 mg for 28 days and again using Nicorette gum or lozenges for breakthrough craving  Give trial of Astelin nasal spray 1 spray each nostril twice daily as demonstrated and instructed  Remember it will take a few days to become effective  If not completely effective you can add Flonase to the Astelin  Again administering it as demonstrated and instructed  Have labs done and I will discuss the results with you when available  Please keep me informed of any crease in frequency, duration, or intensity of the chest pain  Wellness Visit for Adults   AMBULATORY CARE:   A wellness visit  is when you see your healthcare provider to get screened for health problems  Your healthcare provider will also give you advice on how to stay healthy  Write down your questions so you remember to ask them  Ask your healthcare provider how often you should have a wellness visit  What happens at a wellness visit:  Your healthcare provider will ask about your health, and your family history of health problems  This includes high blood pressure, heart disease, and cancer  He or she will ask if you have symptoms that concern you, if you smoke, and about your mood  You may also be asked about your intake of medicines, supplements, food, and alcohol  Any of the following may be done: Your weight  will be checked  Your height may also be checked so your body mass index (BMI) can be calculated  Your BMI shows if you are at a healthy weight  Your blood pressure  and heart rate will be checked  Your temperature may also be checked      Blood and urine tests may be done  Blood tests may be done to check your cholesterol levels  Abnormal cholesterol levels increase your risk for heart disease and stroke  You may also need a blood or urine test to check for diabetes if you are at increased risk  Urine tests may be done to look for signs of an infection or kidney disease  A physical exam  includes checking your heartbeat and lungs with a stethoscope  Your healthcare provider may also check your skin to look for sun damage  Screening tests  may be recommended  A screening test is done to check for diseases that may not cause symptoms  The screening tests you may need depend on your age, gender, family history, and lifestyle habits  For example, colorectal screening may be recommended if you are 48years old or older  Screening tests you need if you are a woman:   A Pap smear  is used to screen for cervical cancer  Pap smears are usually done every 3 to 5 years depending on your age  You may need them more often if you have had abnormal Pap smear test results in the past  Ask your healthcare provider how often you should have a Pap smear  A mammogram  is an x-ray of your breasts to screen for breast cancer  Experts recommend mammograms every 2 years starting at age 48 years  You may need a mammogram at age 52 years or younger if you have an increased risk for breast cancer  Talk to your healthcare provider about when you should start having mammograms and how often you need them  Vaccines you may need:   Get an influenza vaccine  every year  The influenza vaccine protects you from the flu  Several types of viruses cause the flu  The viruses change over time, so new vaccines are made each year  Get a tetanus-diphtheria (Td) booster vaccine  every 10 years  This vaccine protects you against tetanus and diphtheria  Tetanus is a severe infection that may cause painful muscle spasms and lockjaw   Diphtheria is a severe bacterial infection that causes a thick covering in the back of your mouth and throat  Get a human papillomavirus (HPV) vaccine  if you are female and aged 23 to 32 or male 23 to 24 and never received it  This vaccine protects you from HPV infection  HPV is the most common infection spread by sexual contact  HPV may also cause vaginal, penile, and anal cancers  Get a pneumococcal vaccine  if you are aged 72 years or older  The pneumococcal vaccine is an injection given to protect you from pneumococcal disease  Pneumococcal disease is an infection caused by pneumococcal bacteria  The infection may cause pneumonia, meningitis, or an ear infection  Get a shingles vaccine  if you are 60 or older, even if you have had shingles before  The shingles vaccine is an injection to protect you from the varicella-zoster virus  This is the same virus that causes chickenpox  Shingles is a painful rash that develops in people who had chickenpox or have been exposed to the virus  How to eat healthy:  My Plate is a model for planning healthy meals  It shows the types and amounts of foods that should go on your plate  Fruits and vegetables make up about half of your plate, and grains and protein make up the other half  A serving of dairy is included on the side of your plate  The amount of calories and serving sizes you need depends on your age, gender, weight, and height  Examples of healthy foods are listed below:  Eat a variety of vegetables  such as dark green, red, and orange vegetables  You can also include canned vegetables low in sodium (salt) and frozen vegetables without added butter or sauces  Eat a variety of fresh fruits , canned fruit in 100% juice, frozen fruit, and dried fruit  Include whole grains  At least half of the grains you eat should be whole grains   Examples include whole-wheat bread, wheat pasta, brown rice, and whole-grain cereals such as oatmeal     Eat a variety of protein foods such as seafood (fish and shellfish), lean meat, and poultry without skin (turkey and chicken)  Examples of lean meats include pork leg, shoulder, or tenderloin, and beef round, sirloin, tenderloin, and extra lean ground beef  Other protein foods include eggs and egg substitutes, beans, peas, soy products, nuts, and seeds  Choose low-fat dairy products such as skim or 1% milk or low-fat yogurt, cheese, and cottage cheese  Limit unhealthy fats  such as butter, hard margarine, and shortening  Exercise:  Exercise at least 30 minutes per day on most days of the week  Some examples of exercise include walking, biking, dancing, and swimming  You can also fit in more physical activity by taking the stairs instead of the elevator or parking farther away from stores  Include muscle strengthening activities 2 days each week  Regular exercise provides many health benefits  It helps you manage your weight, and decreases your risk for type 2 diabetes, heart disease, stroke, and high blood pressure  Exercise can also help improve your mood  Ask your healthcare provider about the best exercise plan for you  General health and safety guidelines:   Do not smoke  Nicotine and other chemicals in cigarettes and cigars can cause lung damage  Ask your healthcare provider for information if you currently smoke and need help to quit  E-cigarettes or smokeless tobacco still contain nicotine  Talk to your healthcare provider before you use these products  Limit alcohol  A drink of alcohol is 12 ounces of beer, 5 ounces of wine, or 1½ ounces of liquor  Lose weight, if needed  Being overweight increases your risk of certain health conditions  These include heart disease, high blood pressure, type 2 diabetes, and certain types of cancer  Protect your skin  Do not sunbathe or use tanning beds  Use sunscreen with a SPF 15 or higher  Apply sunscreen at least 15 minutes before you go outside  Reapply sunscreen every 2 hours   Wear protective clothing, hats, and sunglasses when you are outside  Drive safely  Always wear your seatbelt  Make sure everyone in your car wears a seatbelt  A seatbelt can save your life if you are in an accident  Do not use your cell phone when you are driving  This could distract you and cause an accident  Pull over if you need to make a call or send a text message  Practice safe sex  Use latex condoms if are sexually active and have more than one partner  Your healthcare provider may recommend screening tests for sexually transmitted infections (STIs)  Wear helmets, lifejackets, and protective gear  Always wear a helmet when you ride a bike or motorcycle, go skiing, or play sports that could cause a head injury  Wear protective equipment when you play sports  Wear a lifejacket when you are on a boat or doing water sports  © Copyright [employer] 2022 Information is for End User's use only and may not be sold, redistributed or otherwise used for commercial purposes  The above information is an  only  It is not intended as medical advice for individual conditions or treatments  Talk to your doctor, nurse or pharmacist before following any medical regimen to see if it is safe and effective for you  Quality Measures:   Depression Screening and Follow-up Plan: Patient was screened for depression during today's encounter  They screened negative with a PHQ-2 score of 0  Return in about 1 year (around 6/22/2024) for Annual physical          Diagnoses and all orders for this visit:    Annual physical exam    Mixed hyperlipidemia  -     Comprehensive metabolic panel; Future  -     Lipid panel;  Future    Fatigue, unspecified type  -     CBC and differential; Future    Tobacco abuse  -     nicotine (NICODERM CQ) 7 mg/24hr TD 24 hr patch; Place 1 patch on the skin over 24 hours every 24 hours  -     nicotine (NICODERM CQ) 14 mg/24hr TD 24 hr patch; Place 1 patch on the skin over 24 hours every 24 hours  -     nicotine (NICODERM CQ) 21 mg/24 hr TD 24 hr patch; Place 1 patch on the skin over 24 hours every 24 hours    Allergic rhinitis, unspecified seasonality, unspecified trigger  -     azelastine (ASTELIN) 0 1 % nasal spray; 1 spray into each nostril 2 (two) times a day Use in each nostril as directed    Bronchospasm  -     albuterol (PROVENTIL HFA,VENTOLIN HFA) 90 mcg/act inhaler; Inhale 2 puffs every 6 (six) hours as needed for wheezing    Chest pain, unspecified type  -     POCT ECG          Subjective:      Patient ID: Jenanette Gallo is a 62 y o  female  49-year-old female presents for her annual physical   Overall she states she is feeling well  She would like to stop smoking  We discussed the available options for her  At this time she would like to try nicotine patches and lozenges  She has been having intermittent episodes of nonspecific chest pain described as sharp, occurring varied times of the day, can be at night, can be while watching TV, can be at work  Not associated with shortness of breath, palpitations, lightheadedness, nausea, visual disturbance  Hyperlipidemia: On low-dose atorvastatin  We will need to repeat labs  Allergic rhinitis: Not responding to Claritin  Using Flonase nasal spray incorrectly  Has a lot of morning postnasal drip and head congestion  EMR has been reviewed, clarified, and updated with patient today        ALLERGIES:  No Known Allergies    CURRENT MEDICATIONS:    Current Outpatient Medications:   •  albuterol (PROVENTIL HFA,VENTOLIN HFA) 90 mcg/act inhaler, Inhale 2 puffs every 6 (six) hours as needed for wheezing, Disp: 8 5 g, Rfl: 5  •  atorvastatin (LIPITOR) 10 mg tablet, TAKE 1 TABLET BY MOUTH EVERY DAY, Disp: 90 tablet, Rfl: 2  •  azelastine (ASTELIN) 0 1 % nasal spray, 1 spray into each nostril 2 (two) times a day Use in each nostril as directed, Disp: 30 mL, Rfl: 3  •  nicotine (NICODERM CQ) 14 mg/24hr TD 24 hr patch, Place 1 patch on the skin over 24 hours every 24 hours, Disp: 28 patch, Rfl: 0  •  nicotine (NICODERM CQ) 21 mg/24 hr TD 24 hr patch, Place 1 patch on the skin over 24 hours every 24 hours, Disp: 28 patch, Rfl: 0  •  nicotine (NICODERM CQ) 7 mg/24hr TD 24 hr patch, Place 1 patch on the skin over 24 hours every 24 hours, Disp: 28 patch, Rfl: 0    ACTIVE PROBLEM LIST:  Patient Active Problem List   Diagnosis   • Lumbar facet joint syndrome   • Hyperlipidemia   • Vitamin D deficiency       PAST MEDICAL HISTORY:  History reviewed  No pertinent past medical history  PAST SURGICAL HISTORY:  Past Surgical History:   Procedure Laterality Date   • NASAL FRACTURE SURGERY     • OVARIAN CYST REMOVAL     • RADIOFREQUENCY ABLATION     • UTERINE FIBROID SURGERY         FAMILY HISTORY:  Family History   Problem Relation Age of Onset   • Uterine cancer Mother    • Diabetes Mother    • Coronary artery disease Father    • Lung cancer Father    • Uterine cancer Sister    • Hyperlipidemia Brother    • Coronary artery disease Maternal Grandmother    • Cancer Maternal Grandfather    • Coronary artery disease Paternal Grandfather        SOCIAL HISTORY:  Social History     Socioeconomic History   • Marital status: Single     Spouse name: Not on file   • Number of children: Not on file   • Years of education: Not on file   • Highest education level: Not on file   Occupational History   • Not on file   Tobacco Use   • Smoking status: Every Day     Packs/day: 0 50     Years: 30 00     Total pack years: 15 00     Types: Cigarettes   • Smokeless tobacco: Never   Substance and Sexual Activity   • Alcohol use:  Yes     Alcohol/week: 1 0 standard drink of alcohol     Types: 1 Glasses of wine per week     Comment: 1 drink/week   • Drug use: Never   • Sexual activity: Yes     Partners: Male     Birth control/protection: Post-menopausal   Other Topics Concern   • Not on file   Social History Narrative        No children        Hobbies-horseback riding    Regular "dental care, brushes twice a day     Social Determinants of Health     Financial Resource Strain: Not on file   Food Insecurity: Not on file   Transportation Needs: Not on file   Physical Activity: Not on file   Stress: Not on file   Social Connections: Not on file   Intimate Partner Violence: Not on file   Housing Stability: Not on file       Review of Systems   Constitutional: Negative for activity change, chills, fatigue and fever  HENT: Positive for congestion, postnasal drip, rhinorrhea and sinus pressure  Eyes: Negative for discharge  Respiratory: Negative for cough, chest tightness and shortness of breath  Cardiovascular: Positive for chest pain  Negative for palpitations and leg swelling  Gastrointestinal: Negative for abdominal pain, blood in stool, constipation, diarrhea, nausea and vomiting  Endocrine: Negative for polydipsia, polyphagia and polyuria  Genitourinary: Negative for difficulty urinating  Musculoskeletal: Negative for arthralgias and myalgias  Skin: Negative for rash  Allergic/Immunologic: Negative for immunocompromised state  Neurological: Negative for dizziness, syncope, weakness, light-headedness and headaches  Hematological: Negative for adenopathy  Does not bruise/bleed easily  Psychiatric/Behavioral: Negative for dysphoric mood, sleep disturbance and suicidal ideas  The patient is not nervous/anxious  Objective:  Vitals:    06/22/23 1455 06/22/23 1543   BP: 128/82 110/70   BP Location: Left arm Left arm   Patient Position: Sitting Sitting   Cuff Size: Adult    Pulse: 75    SpO2: 98%    Weight: 54 4 kg (120 lb)    Height: 5' 2\" (1 575 m)      Body mass index is 21 95 kg/m²  Physical Exam  Vitals and nursing note reviewed  Constitutional:       General: She is not in acute distress  Appearance: She is well-developed  She is not ill-appearing  HENT:      Head: Normocephalic and atraumatic        Right Ear: Tympanic membrane, ear canal and " external ear normal       Left Ear: Tympanic membrane, ear canal and external ear normal       Nose: Nose normal       Comments: Swollen boggy left inferior nasal turbinate  Increased clear mucoid drainage     Mouth/Throat:      Mouth: Mucous membranes are moist       Pharynx: Oropharynx is clear  Eyes:      General: No scleral icterus  Right eye: No discharge  Left eye: No discharge  Extraocular Movements: Extraocular movements intact  Conjunctiva/sclera: Conjunctivae normal       Pupils: Pupils are equal, round, and reactive to light  Neck:      Thyroid: No thyromegaly  Vascular: No carotid bruit  Cardiovascular:      Rate and Rhythm: Normal rate and regular rhythm  Pulses: Normal pulses  Heart sounds: Normal heart sounds  No murmur heard  Pulmonary:      Effort: Pulmonary effort is normal  No respiratory distress  Breath sounds: Normal breath sounds  Chest:      Chest wall: No tenderness  Abdominal:      General: Abdomen is flat  Bowel sounds are normal  There is no distension  Palpations: Abdomen is soft  There is no hepatomegaly, splenomegaly or mass  Tenderness: There is no abdominal tenderness  There is no right CVA tenderness, left CVA tenderness, guarding or rebound  Musculoskeletal:         General: No swelling  Normal range of motion  Cervical back: Normal range of motion and neck supple  Right lower leg: No edema  Left lower leg: No edema  Lymphadenopathy:      Cervical: No cervical adenopathy  Skin:     General: Skin is warm and dry  Findings: No rash  Neurological:      General: No focal deficit present  Mental Status: She is alert and oriented to person, place, and time  Cranial Nerves: No cranial nerve deficit  Sensory: No sensory deficit  Motor: No weakness  Coordination: Coordination normal       Gait: Gait normal       Deep Tendon Reflexes: Reflexes are normal and symmetric  Reflexes normal    Psychiatric:         Mood and Affect: Mood normal          Behavior: Behavior normal          Thought Content: Thought content normal          Judgment: Judgment normal            RESULTS:    In chart    This note was created with voice recognition software  Phonic, grammatical and spelling errors may be present within the note as a result

## 2023-06-25 LAB
ALBUMIN SERPL-MCNC: 4.5 G/DL (ref 3.8–4.9)
ALBUMIN/GLOB SERPL: 2.3 {RATIO} (ref 1.2–2.2)
ALP SERPL-CCNC: 77 IU/L (ref 44–121)
ALT SERPL-CCNC: 15 IU/L (ref 0–32)
AST SERPL-CCNC: 19 IU/L (ref 0–40)
BASOPHILS # BLD AUTO: 0.1 X10E3/UL (ref 0–0.2)
BASOPHILS NFR BLD AUTO: 1 %
BILIRUB SERPL-MCNC: 0.3 MG/DL (ref 0–1.2)
BUN SERPL-MCNC: 12 MG/DL (ref 6–24)
BUN/CREAT SERPL: 17 (ref 9–23)
CALCIUM SERPL-MCNC: 9.6 MG/DL (ref 8.7–10.2)
CHLORIDE SERPL-SCNC: 105 MMOL/L (ref 96–106)
CHOLEST SERPL-MCNC: 143 MG/DL (ref 100–199)
CO2 SERPL-SCNC: 23 MMOL/L (ref 20–29)
CREAT SERPL-MCNC: 0.72 MG/DL (ref 0.57–1)
EGFR: 97 ML/MIN/1.73
EOSINOPHIL # BLD AUTO: 0.6 X10E3/UL (ref 0–0.4)
EOSINOPHIL NFR BLD AUTO: 6 %
ERYTHROCYTE [DISTWIDTH] IN BLOOD BY AUTOMATED COUNT: 12.9 % (ref 11.7–15.4)
GLOBULIN SER-MCNC: 2 G/DL (ref 1.5–4.5)
GLUCOSE SERPL-MCNC: 95 MG/DL (ref 70–99)
HCT VFR BLD AUTO: 40.6 % (ref 34–46.6)
HDLC SERPL-MCNC: 59 MG/DL
HGB BLD-MCNC: 13.9 G/DL (ref 11.1–15.9)
IMM GRANULOCYTES # BLD: 0 X10E3/UL (ref 0–0.1)
IMM GRANULOCYTES NFR BLD: 0 %
LDLC SERPL CALC-MCNC: 70 MG/DL (ref 0–99)
LYMPHOCYTES # BLD AUTO: 3.3 X10E3/UL (ref 0.7–3.1)
LYMPHOCYTES NFR BLD AUTO: 36 %
MCH RBC QN AUTO: 30.5 PG (ref 26.6–33)
MCHC RBC AUTO-ENTMCNC: 34.2 G/DL (ref 31.5–35.7)
MCV RBC AUTO: 89 FL (ref 79–97)
MONOCYTES # BLD AUTO: 0.6 X10E3/UL (ref 0.1–0.9)
MONOCYTES NFR BLD AUTO: 7 %
NEUTROPHILS # BLD AUTO: 4.6 X10E3/UL (ref 1.4–7)
NEUTROPHILS NFR BLD AUTO: 50 %
PLATELET # BLD AUTO: 298 X10E3/UL (ref 150–450)
POTASSIUM SERPL-SCNC: 4.4 MMOL/L (ref 3.5–5.2)
PROT SERPL-MCNC: 6.5 G/DL (ref 6–8.5)
RBC # BLD AUTO: 4.55 X10E6/UL (ref 3.77–5.28)
SL AMB VLDL CHOLESTEROL CALC: 14 MG/DL (ref 5–40)
SODIUM SERPL-SCNC: 143 MMOL/L (ref 134–144)
TRIGL SERPL-MCNC: 68 MG/DL (ref 0–149)
WBC # BLD AUTO: 9.2 X10E3/UL (ref 3.4–10.8)

## 2023-06-29 ENCOUNTER — HOSPITAL ENCOUNTER (OUTPATIENT)
Dept: NON INVASIVE DIAGNOSTICS | Facility: CLINIC | Age: 58
Discharge: HOME | End: 2023-06-29
Payer: COMMERCIAL

## 2023-06-29 VITALS
HEART RATE: 71 BPM | OXYGEN SATURATION: 99 % | BODY MASS INDEX: 20.49 KG/M2 | WEIGHT: 120 LBS | HEIGHT: 64 IN | DIASTOLIC BLOOD PRESSURE: 84 MMHG | SYSTOLIC BLOOD PRESSURE: 124 MMHG

## 2023-06-29 DIAGNOSIS — R94.31 ABNORMAL EKG: ICD-10-CM

## 2023-06-29 DIAGNOSIS — R07.9 CHEST PAIN, UNSPECIFIED TYPE: ICD-10-CM

## 2023-06-29 LAB
CHEST PAIN STATEMENT: NORMAL
MAX DIASTOLIC BP: 96 MMHG
MAX HEART RATE: 153 BPM
MAX HR PERCENT: 94 %
MAX HR: 153 BPM
MAX PREDICTED HEART RATE: 162 BPM
MAX. SYSTOLIC BP: 198 MMHG
NUC STRESS EJECTION FRACTION: 72 %
PROTOCOL NAME: NORMAL
RATE PRESSURE PRODUCT: NORMAL
SL CV REST NUCLEAR ISOTOPE DOSE: 10.2 MCI
SL CV STRESS NUCLEAR ISOTOPE DOSE: 31.5 MCI
SL CV STRESS RECOVERY BP: NORMAL MMHG
SL CV STRESS RECOVERY HR: 100 BPM
SL CV STRESS RECOVERY O2 SAT: 99 %
SL CV STRESS STAGE REACHED: 3
STRESS ANGINA INDEX: 0
STRESS BASELINE BP: NORMAL MMHG
STRESS BASELINE HR: 71 BPM
STRESS O2 SAT REST: 99 %
STRESS PEAK HR: 153 BPM
STRESS POST ESTIMATED WORKLOAD: 10.1 METS
STRESS POST EXERCISE DUR MIN: 9 MIN
STRESS POST O2 SAT PEAK: 98 %
STRESS POST PEAK BP: 198 MMHG
TARGET HR FORMULA: NORMAL
TIME IN EXERCISE PHASE: NORMAL

## 2023-06-29 PROCEDURE — 78452 HT MUSCLE IMAGE SPECT MULT: CPT

## 2023-06-29 PROCEDURE — 93016 CV STRESS TEST SUPVJ ONLY: CPT | Performed by: INTERNAL MEDICINE

## 2023-06-29 PROCEDURE — G1004 CDSM NDSC: HCPCS

## 2023-06-29 PROCEDURE — A9502 TC99M TETROFOSMIN: HCPCS

## 2023-06-29 PROCEDURE — 93017 CV STRESS TEST TRACING ONLY: CPT

## 2023-06-29 PROCEDURE — 93018 CV STRESS TEST I&R ONLY: CPT | Performed by: INTERNAL MEDICINE

## 2023-06-29 PROCEDURE — 78452 HT MUSCLE IMAGE SPECT MULT: CPT | Performed by: INTERNAL MEDICINE

## 2023-07-11 ENCOUNTER — HOSPITAL ENCOUNTER (OUTPATIENT)
Dept: NON INVASIVE DIAGNOSTICS | Facility: CLINIC | Age: 58
Discharge: HOME | End: 2023-07-11
Payer: COMMERCIAL

## 2023-07-11 VITALS
WEIGHT: 120 LBS | BODY MASS INDEX: 20.49 KG/M2 | SYSTOLIC BLOOD PRESSURE: 124 MMHG | DIASTOLIC BLOOD PRESSURE: 84 MMHG | HEART RATE: 71 BPM | HEIGHT: 64 IN

## 2023-07-11 DIAGNOSIS — R94.31 ABNORMAL EKG: ICD-10-CM

## 2023-07-11 DIAGNOSIS — R07.9 CHEST PAIN, UNSPECIFIED TYPE: ICD-10-CM

## 2023-07-11 LAB
AORTIC ROOT: 2.8 CM
APICAL FOUR CHAMBER EJECTION FRACTION: 64 %
ASCENDING AORTA: 2.7 CM
E WAVE DECELERATION TIME: 177 MS
FRACTIONAL SHORTENING: 29 % (ref 28–44)
INTERVENTRICULAR SEPTUM IN DIASTOLE (PARASTERNAL SHORT AXIS VIEW): 0.8 CM
INTERVENTRICULAR SEPTUM: 0.8 CM (ref 0.6–1.1)
LAAS-AP2: 11.4 CM2
LAAS-AP4: 13.2 CM2
LEFT ATRIUM SIZE: 2.7 CM
LEFT ATRIUM VOLUME (MOD BIPLANE): 27 ML
LEFT INTERNAL DIMENSION IN SYSTOLE: 3.2 CM (ref 2.1–4)
LEFT VENTRICULAR INTERNAL DIMENSION IN DIASTOLE: 4.5 CM (ref 3.5–6)
LEFT VENTRICULAR POSTERIOR WALL IN END DIASTOLE: 0.7 CM
LEFT VENTRICULAR STROKE VOLUME: 53 ML
LVSV (TEICH): 53 ML
MV E'TISSUE VEL-SEP: 11 CM/S
MV PEAK A VEL: 0.69 M/S
MV PEAK E VEL: 59 CM/S
MV STENOSIS PRESSURE HALF TIME: 51 MS
MV VALVE AREA P 1/2 METHOD: 4.31 CM2
RIGHT ATRIUM AREA SYSTOLE A4C: 11.9 CM2
RIGHT VENTRICLE ID DIMENSION: 2.6 CM
SL CV LEFT ATRIUM LENGTH A2C: 4.2 CM
SL CV LV EF: 64
SL CV PED ECHO LEFT VENTRICLE DIASTOLIC VOLUME (MOD BIPLANE) 2D: 93 ML
SL CV PED ECHO LEFT VENTRICLE SYSTOLIC VOLUME (MOD BIPLANE) 2D: 40 ML
TR MAX PG: 26 MMHG
TR PEAK VELOCITY: 2.6 M/S
TRICUSPID ANNULAR PLANE SYSTOLIC EXCURSION: 1.8 CM
TRICUSPID VALVE PEAK REGURGITATION VELOCITY: 2.57 M/S

## 2023-07-11 PROCEDURE — 93306 TTE W/DOPPLER COMPLETE: CPT

## 2023-07-11 PROCEDURE — 93306 TTE W/DOPPLER COMPLETE: CPT | Performed by: INTERNAL MEDICINE

## 2023-08-10 DIAGNOSIS — E78.5 HYPERLIPIDEMIA, UNSPECIFIED HYPERLIPIDEMIA TYPE: ICD-10-CM

## 2023-08-10 RX ORDER — ATORVASTATIN CALCIUM 10 MG/1
TABLET, FILM COATED ORAL
Qty: 30 TABLET | Refills: 8 | Status: SHIPPED | OUTPATIENT
Start: 2023-08-10

## 2023-09-11 DIAGNOSIS — E78.5 HYPERLIPIDEMIA, UNSPECIFIED HYPERLIPIDEMIA TYPE: ICD-10-CM

## 2023-09-11 RX ORDER — ATORVASTATIN CALCIUM 10 MG/1
10 TABLET, FILM COATED ORAL DAILY
Qty: 30 TABLET | Refills: 8 | Status: SHIPPED | OUTPATIENT
Start: 2023-09-11

## 2024-04-17 ENCOUNTER — APPOINTMENT (EMERGENCY)
Dept: RADIOLOGY | Facility: HOSPITAL | Age: 59
End: 2024-04-17
Payer: COMMERCIAL

## 2024-04-17 ENCOUNTER — HOSPITAL ENCOUNTER (EMERGENCY)
Facility: HOSPITAL | Age: 59
Discharge: HOME | End: 2024-04-18
Attending: EMERGENCY MEDICINE | Admitting: EMERGENCY MEDICINE
Payer: COMMERCIAL

## 2024-04-17 DIAGNOSIS — R07.9 CHEST PAIN: Primary | ICD-10-CM

## 2024-04-17 LAB
ALBUMIN SERPL BCP-MCNC: 4.3 G/DL (ref 3.5–5)
ALP SERPL-CCNC: 76 U/L (ref 34–104)
ALT SERPL W P-5'-P-CCNC: 11 U/L (ref 7–52)
ANION GAP SERPL CALCULATED.3IONS-SCNC: 10 MMOL/L (ref 4–13)
AST SERPL W P-5'-P-CCNC: 15 U/L (ref 13–39)
BASOPHILS # BLD AUTO: 0.06 THOUSANDS/ÂΜL (ref 0–0.1)
BASOPHILS NFR BLD AUTO: 0 % (ref 0–1)
BILIRUB SERPL-MCNC: 0.67 MG/DL (ref 0.2–1)
BUN SERPL-MCNC: 16 MG/DL (ref 5–25)
CALCIUM SERPL-MCNC: 9.2 MG/DL (ref 8.4–10.2)
CARDIAC TROPONIN I PNL SERPL HS: <2 NG/L
CARDIAC TROPONIN I PNL SERPL HS: <2 NG/L
CHLORIDE SERPL-SCNC: 100 MMOL/L (ref 96–108)
CO2 SERPL-SCNC: 25 MMOL/L (ref 21–32)
CREAT SERPL-MCNC: 0.65 MG/DL (ref 0.6–1.3)
D DIMER PPP FEU-MCNC: 0.47 UG/ML FEU
EOSINOPHIL # BLD AUTO: 0.13 THOUSAND/ÂΜL (ref 0–0.61)
EOSINOPHIL NFR BLD AUTO: 1 % (ref 0–6)
ERYTHROCYTE [DISTWIDTH] IN BLOOD BY AUTOMATED COUNT: 13.1 % (ref 11.6–15.1)
GFR SERPL CREATININE-BSD FRML MDRD: 98 ML/MIN/1.73SQ M
GLUCOSE SERPL-MCNC: 103 MG/DL (ref 65–140)
HCT VFR BLD AUTO: 38.8 % (ref 34.8–46.1)
HGB BLD-MCNC: 12.2 G/DL (ref 11.5–15.4)
IMM GRANULOCYTES # BLD AUTO: 0.04 THOUSAND/UL (ref 0–0.2)
IMM GRANULOCYTES NFR BLD AUTO: 0 % (ref 0–2)
LYMPHOCYTES # BLD AUTO: 2.16 THOUSANDS/ÂΜL (ref 0.6–4.47)
LYMPHOCYTES NFR BLD AUTO: 16 % (ref 14–44)
MCH RBC QN AUTO: 27.1 PG (ref 26.8–34.3)
MCHC RBC AUTO-ENTMCNC: 31.4 G/DL (ref 31.4–37.4)
MCV RBC AUTO: 86 FL (ref 82–98)
MONOCYTES # BLD AUTO: 1.2 THOUSAND/ÂΜL (ref 0.17–1.22)
MONOCYTES NFR BLD AUTO: 9 % (ref 4–12)
NEUTROPHILS # BLD AUTO: 10.04 THOUSANDS/ÂΜL (ref 1.85–7.62)
NEUTS SEG NFR BLD AUTO: 74 % (ref 43–75)
NRBC BLD AUTO-RTO: 0 /100 WBCS
PLATELET # BLD AUTO: 303 THOUSANDS/UL (ref 149–390)
PMV BLD AUTO: 9.7 FL (ref 8.9–12.7)
POTASSIUM SERPL-SCNC: 3.6 MMOL/L (ref 3.5–5.3)
PROT SERPL-MCNC: 7.7 G/DL (ref 6.4–8.4)
RBC # BLD AUTO: 4.51 MILLION/UL (ref 3.81–5.12)
SODIUM SERPL-SCNC: 135 MMOL/L (ref 135–147)
WBC # BLD AUTO: 13.63 THOUSAND/UL (ref 4.31–10.16)

## 2024-04-17 PROCEDURE — 36415 COLL VENOUS BLD VENIPUNCTURE: CPT | Performed by: EMERGENCY MEDICINE

## 2024-04-17 PROCEDURE — 96374 THER/PROPH/DIAG INJ IV PUSH: CPT

## 2024-04-17 PROCEDURE — 71045 X-RAY EXAM CHEST 1 VIEW: CPT

## 2024-04-17 PROCEDURE — 80053 COMPREHEN METABOLIC PANEL: CPT | Performed by: EMERGENCY MEDICINE

## 2024-04-17 PROCEDURE — 84484 ASSAY OF TROPONIN QUANT: CPT | Performed by: EMERGENCY MEDICINE

## 2024-04-17 PROCEDURE — 85379 FIBRIN DEGRADATION QUANT: CPT | Performed by: EMERGENCY MEDICINE

## 2024-04-17 PROCEDURE — 99285 EMERGENCY DEPT VISIT HI MDM: CPT | Performed by: EMERGENCY MEDICINE

## 2024-04-17 PROCEDURE — 93005 ELECTROCARDIOGRAM TRACING: CPT

## 2024-04-17 PROCEDURE — 85025 COMPLETE CBC W/AUTO DIFF WBC: CPT | Performed by: EMERGENCY MEDICINE

## 2024-04-17 PROCEDURE — 99285 EMERGENCY DEPT VISIT HI MDM: CPT

## 2024-04-17 RX ORDER — KETOROLAC TROMETHAMINE 30 MG/ML
15 INJECTION, SOLUTION INTRAMUSCULAR; INTRAVENOUS ONCE
Status: COMPLETED | OUTPATIENT
Start: 2024-04-17 | End: 2024-04-17

## 2024-04-17 RX ADMIN — KETOROLAC TROMETHAMINE 15 MG: 30 INJECTION, SOLUTION INTRAMUSCULAR; INTRAVENOUS at 21:48

## 2024-04-18 VITALS
HEART RATE: 76 BPM | DIASTOLIC BLOOD PRESSURE: 75 MMHG | RESPIRATION RATE: 21 BRPM | OXYGEN SATURATION: 93 % | SYSTOLIC BLOOD PRESSURE: 114 MMHG

## 2024-04-18 LAB
ATRIAL RATE: 86 BPM
P AXIS: 67 DEGREES
PR INTERVAL: 126 MS
QRS AXIS: 82 DEGREES
QRSD INTERVAL: 72 MS
QT INTERVAL: 404 MS
QTC INTERVAL: 483 MS
T WAVE AXIS: 52 DEGREES
VENTRICULAR RATE: 86 BPM

## 2024-04-18 PROCEDURE — 93010 ELECTROCARDIOGRAM REPORT: CPT | Performed by: INTERNAL MEDICINE

## 2024-04-18 NOTE — RESULT ENCOUNTER NOTE
Spoke with patient to have follow up CXR in 6-8 weeks. Patient stated that her symptoms resolved at this time.

## 2024-04-18 NOTE — ED PROVIDER NOTES
"History  Chief Complaint   Patient presents with    Chest Pain     Pt reports L sided CP under breast, pt states pain is sharp in nature. Denies radiating pains. + nausea, denies vomiting. Denies cardiac hx. Pt also reports palpitations.      58 y.o. female presents with a chief complaint of \"I'm having chest pains.\"    Patient affirms 1 hour of left sided chest pain without radiation. Patient describes \"a knife\" that came on suddenly while driving and continues in the ER. Patient states nothing worsens the pain and nothing improves the pain.   Patient denies pleuritic component to chest pain. Patient denies exertional component to the chest pain.    Patient denies fever/chills.  Patient denies diaphoresis.  Patient denies cough.  Patient denies hemoptysis.  Patient denies vomiting.  Patient marino leg pain or swelling.    The patient denies a history of atherosclerotic disease (CAD/TIA/CVA/PAD).    Patient denies any history of hypertension.  Patient affirms hyperlipidemia.   Patient denies diabetes.  Patient denies obesity.  Patient denies any early family history of CAD (male less than 56yo or female less than 66 yo).    Patient denies any use of tobacco in the past 90 days.  Patient stopped smoking nearly a year ago.  Patient denies any use of illicit drugs, including cocaine.      Patient denies any immobilization of at least 3 days or surgery in the past 4 weeks.    Patient denies any history of DVT or PE.    Patient denies any history or family history of thrombophilia.  Patient denies any malignancy with treatment within the past 6 months.   Patient denies any use of exogenous estrogen containing compounds.    Focused Objective.  CV:  Normal inspection with no rash, signs of infection, or trauma.  Borderline tachycardic rate and rhythm. No murmur. Peripheral pulses intact and equal.  Tender to palpitation over area of patient's reported pain.  Respiratory:  Lungs clear to auscultation bilaterally without " adventitious sounds.  Skin:  Warm and dry.  No rash or signs of herpes zoster over area of patient's reported pain.  Extremities:  Non-tender lower extremities without asymmetry; no clinical signs of DVT. No lower extremity edema.      Medical Decision Making    Patient presenting with chest pain with a broad differential, including multiple emergent etiologies.      Patient has a history of cardiac risk factors which increases the evaluation of the complexity of their presenting complaint.    External review of the medical record including prior OBGYN note; patient is postmenopausal.    EKG obtained and reviewed independently by myself, which was interpreted by myself as normal sinus rhythm with nonspecific findings.  Patient placed on cardiac monitoring.    Regarding the possibility for ACS, patient's risk stratification.      HEART score:    History 1=Moderate suspicious  ECG 1=Nonspecific repolarization disturbance  Age 1= > 45 - <65 years  Risk Factors 1= 1 or 2 risk factors  Troponin 0= Less than or equal to 12 ng/L  Total 4       Score 0-3: 1.7% had a MACE risk    0.4% (1 patient)     36.4% of patients were in this low risk group    Score 4-6: 16.6% had a MACE risk    Score 7-10: 50.1% had a MACE risk       The patient's HEART score is 4.  CXR will be obtained to evaluate for alternative pathologies, including pneumothorax or pneumonia.  Laboratory analysis including troponin to evaluate for ACS, CBC to evaluate for anemia or leukocytosis, and BMP to evaluate renal function and electrolytes considering possibility of cardiac involvement.     Patient has symptoms and history concerning for possible pulmonary embolism.  Regarding this etiology, patient's risk stratification by the Wells' Criteria for Pulmonary Embolism (Two Tier Model):  no - clinical signs or symptoms of DVT (3)  no - PE most likely diagnosis (3)  yes - Heart rate greater than 100 (1.5)  no - Immobilization at least 3 days OR surgery in  the previous 4 weeks (1.5)  no - Previous, objectively diagnosed PE or DVT (1.5)  no - Hemoptysis (1)  no - Malignancy with treatment within 6 months or palliative (1)    Patient's risk further evaluated by the PERC Criteria for Pulmonary Embolism:  yes - age is greater than or equal to 50  yes - Heart rate greater than 100  no - O2 sat on room air < 95%  no - Prior history of PE or DVT  no - Recent trauma or surgery  no - Hemoptysis  no - Use of exogenous estrogen  no - Unilateral leg swelling    Patient has a low risk Wells' criteria but is unable to be cleared via the PERC criteria.  As such, a D-Dimer will be ordered for the patient to evaluate for the potential for pulmonary embolism.  If positive, CT imaging of the patient's chest will be obtained to evaluate for potential pulmonary embolism.    The patient's HEART score indicates a lower-moderate risk regarding the possibility of ACS.   In accordance with our established guidance, patient will be risk stratified based on their initial troponin to determine whether a second troponin will be obtained 2 hours after the initial troponin to evaluate whether the patient is appropriate for discharge from the emergency department for continued outpatient follow up.  Patient on continuous cardiac monitoring and has been instructed to inform nursing with any change in the character or severity of their chest pain so repeat EKG can be obtained.          Prior to Admission Medications   Prescriptions Last Dose Informant Patient Reported? Taking?   albuterol (PROVENTIL HFA,VENTOLIN HFA) 90 mcg/act inhaler   No No   Sig: Inhale 2 puffs every 6 (six) hours as needed for wheezing   atorvastatin (LIPITOR) 10 mg tablet   No No   Sig: Take 1 tablet (10 mg total) by mouth daily   azelastine (ASTELIN) 0.1 % nasal spray   No No   Si spray into each nostril 2 (two) times a day Use in each nostril as directed   nicotine (NICODERM CQ) 14 mg/24hr TD 24 hr patch   No No   Sig:  Place 1 patch on the skin over 24 hours every 24 hours   nicotine (NICODERM CQ) 21 mg/24 hr TD 24 hr patch   No No   Sig: Place 1 patch on the skin over 24 hours every 24 hours   nicotine (NICODERM CQ) 7 mg/24hr TD 24 hr patch   No No   Sig: Place 1 patch on the skin over 24 hours every 24 hours      Facility-Administered Medications: None       History reviewed. No pertinent past medical history.    Past Surgical History:   Procedure Laterality Date    NASAL FRACTURE SURGERY      OVARIAN CYST REMOVAL      RADIOFREQUENCY ABLATION      UTERINE FIBROID SURGERY         Family History   Problem Relation Age of Onset    Uterine cancer Mother     Diabetes Mother     Coronary artery disease Father     Lung cancer Father     Uterine cancer Sister     Hyperlipidemia Brother     Coronary artery disease Maternal Grandmother     Cancer Maternal Grandfather     Coronary artery disease Paternal Grandfather      I have reviewed and agree with the history as documented.    E-Cigarette/Vaping     E-Cigarette/Vaping Substances     Social History     Tobacco Use    Smoking status: Former     Current packs/day: 0.50     Average packs/day: 0.5 packs/day for 30.0 years (15.0 ttl pk-yrs)     Types: Cigarettes    Smokeless tobacco: Never   Substance Use Topics    Alcohol use: Yes     Alcohol/week: 1.0 standard drink of alcohol     Types: 1 Glasses of wine per week     Comment: 1 drink/week    Drug use: Never       Review of Systems    Physical Exam  Physical Exam    Vital Signs  ED Triage Vitals   Temp Pulse Respirations Blood Pressure SpO2   -- 04/17/24 2108 04/17/24 2108 04/17/24 2108 04/17/24 2108    100 20 118/68 97 %      Temp src Heart Rate Source Patient Position - Orthostatic VS BP Location FiO2 (%)   -- 04/17/24 2108 04/17/24 2108 04/17/24 2108 --    Monitor Sitting Left arm       Pain Score       04/17/24 2148       10 - Worst Possible Pain           Vitals:    04/17/24 2230 04/17/24 2300 04/17/24 2330 04/18/24 0000   BP: 108/59  118/63 110/83 114/75   Pulse: 74 74 75 76   Patient Position - Orthostatic VS:             Visual Acuity      ED Medications  Medications   ketorolac (TORADOL) injection 15 mg (15 mg Intravenous Given 4/17/24 2148)       Diagnostic Studies  Results Reviewed       Procedure Component Value Units Date/Time    HS Troponin I 2hr [[id number]] Collected: 04/17/24 2319    Lab Status: Final result Specimen: Blood from Arm, Left Updated: 04/17/24 2351     hs TnI 2hr <2 ng/L      Delta 2hr hsTnI --    D-dimer, quantitative [[id number]]  (Normal) Collected: 04/17/24 2146    Lab Status: Final result Specimen: Blood from Arm, Right Updated: 04/17/24 2242     D-Dimer, Quant 0.47 ug/ml FEU     Narrative:      In the evaluation for possible pulmonary embolism, in the appropriate (Well's Score of 4 or less) patient, the age adjusted d-dimer cutoff for this patient can be calculated as:    Age x 0.01 (in ug/mL) for Age-adjusted D-dimer exclusion threshold for a patient over 50 years.    HS Troponin 0hr (reflex protocol) [[id number]]  (Normal) Collected: 04/17/24 2117    Lab Status: Final result Specimen: Blood from Arm, Left Updated: 04/17/24 2154     hs TnI 0hr <2 ng/L     Comprehensive metabolic panel [[id number]] Collected: 04/17/24 2117    Lab Status: Final result Specimen: Blood from Arm, Left Updated: 04/17/24 2145     Sodium 135 mmol/L      Potassium 3.6 mmol/L      Chloride 100 mmol/L      CO2 25 mmol/L      ANION GAP 10 mmol/L      BUN 16 mg/dL      Creatinine 0.65 mg/dL      Glucose 103 mg/dL      Calcium 9.2 mg/dL      AST 15 U/L      ALT 11 U/L      Alkaline Phosphatase 76 U/L      Total Protein 7.7 g/dL      Albumin 4.3 g/dL      Total Bilirubin 0.67 mg/dL      eGFR 98 ml/min/1.73sq m     Narrative:      National Kidney Disease Foundation guidelines for Chronic Kidney Disease (CKD):     Stage 1 with normal or high GFR (GFR > 90 mL/min/1.73 square meters)    Stage 2 Mild CKD (GFR = 60-89 mL/min/1.73 square meters)    Stage 3A  Moderate CKD (GFR = 45-59 mL/min/1.73 square meters)    Stage 3B Moderate CKD (GFR = 30-44 mL/min/1.73 square meters)    Stage 4 Severe CKD (GFR = 15-29 mL/min/1.73 square meters)    Stage 5 End Stage CKD (GFR <15 mL/min/1.73 square meters)  Note: GFR calculation is accurate only with a steady state creatinine    CBC and differential [[id number]]  (Abnormal) Collected: 04/17/24 2117    Lab Status: Final result Specimen: Blood from Arm, Left Updated: 04/17/24 2126     WBC 13.63 Thousand/uL      RBC 4.51 Million/uL      Hemoglobin 12.2 g/dL      Hematocrit 38.8 %      MCV 86 fL      MCH 27.1 pg      MCHC 31.4 g/dL      RDW 13.1 %      MPV 9.7 fL      Platelets 303 Thousands/uL      nRBC 0 /100 WBCs      Segmented % 74 %      Immature Grans % 0 %      Lymphocytes % 16 %      Monocytes % 9 %      Eosinophils Relative 1 %      Basophils Relative 0 %      Absolute Neutrophils 10.04 Thousands/µL      Absolute Immature Grans 0.04 Thousand/uL      Absolute Lymphocytes 2.16 Thousands/µL      Absolute Monocytes 1.20 Thousand/µL      Eosinophils Absolute 0.13 Thousand/µL      Basophils Absolute 0.06 Thousands/µL                    XR chest 1 view portable   ED Interpretation by Miguel Angel Panda MD (04/17 2256)   No acute findings.      Final Result by Arnold Spence MD (04/18 5380)      Patchy opacity in left lung base, which may represent an infectious/inflammatory process in the appropriate clinical context. Consider treatment if supporting clinical symptoms, and recommend follow-up in approximately 6 to 8 weeks to assess for interval    change.      The study was marked in EPIC for immediate notification.            Workstation performed: [id number]                    Procedures  Procedures         ED Course  ED Course as of 04/22/24 0137   Thu Apr 18, 2024   0010 Patient's laboratory evaluation reassuring.  Troponin and delta troponin negative.  Patient did have stress testing last year but as she has moderate risk heart  score, will refer to cardiology regarding continued evaluation.  Patient notes resolution of symptoms following treatment.  Discussed potential etiologies but emphasized diagnostic uncertainty.  Discussed return precautions in detail.                               SBIRT 20yo+      Flowsheet Row Most Recent Value   Initial Alcohol Screen: US AUDIT-C     1. How often do you have a drink containing alcohol? 0 Filed at: 04/17/2024 2121   2. How many drinks containing alcohol do you have on a typical day you are drinking?  0 Filed at: 04/17/2024 2121   3b. FEMALE Any Age, or MALE 65+: How often do you have 4 or more drinks on one occassion? 0 Filed at: 04/17/2024 2121   Audit-C Score 0 Filed at: 04/17/2024 2121   ESTEVAN: How many times in the past year have you...    Used an illegal drug or used a prescription medication for non-medical reasons? Never Filed at: 04/17/2024 2121                      Medical Decision Making  Amount and/or Complexity of Data Reviewed  Labs: ordered.  Radiology: ordered and independent interpretation performed.    Risk  Prescription drug management.             Disposition  Final diagnoses:   Chest pain     Time reflects when diagnosis was documented in both MDM as applicable and the Disposition within this note       Time User Action Codes Description Comment    4/18/2024 12:09 AM Miguel Angel Panda Add [R07.9] Chest pain           ED Disposition       ED Disposition   Discharge    Condition   Stable    Date/Time   Thu Apr 18, 2024 0009    Comment   Nydia Russell discharge to home/self care.                   Follow-up Information       Follow up With Specialties Details Why Contact Info Additional Information    [facility] Cardiology [facility] Cardiology Schedule an appointment as soon as possible for a visit  Follow-up and reassessment. [address]  [facility] [id number] [phone] [facility] Cardiology [facility], [address],  [place] [address], [place], [place], [id number] [phone]    [facility] Emergency Department Emergency Medicine Go to  If symptoms worsen [address] [address] [phone] [facility] Emergency Department, [address] [place], [place], [address]            Discharge Medication List as of 4/18/2024 12:13 AM        CONTINUE these medications which have NOT CHANGED    Details   albuterol (PROVENTIL HFA,VENTOLIN HFA) 90 mcg/act inhaler Inhale 2 puffs every 6 (six) hours as needed for wheezing, Starting Thu 6/22/2023, Normal      atorvastatin (LIPITOR) 10 mg tablet Take 1 tablet (10 mg total) by mouth daily, Starting Mon 9/11/2023, Normal      azelastine (ASTELIN) 0.1 % nasal spray 1 spray into each nostril 2 (two) times a day Use in each nostril as directed, Starting Thu 6/22/2023, Normal      nicotine (NICODERM CQ) 14 mg/24hr TD 24 hr patch Place 1 patch on the skin over 24 hours every 24 hours, Starting Thu 6/22/2023, Normal      nicotine (NICODERM CQ) 21 mg/24 hr TD 24 hr patch Place 1 patch on the skin over 24 hours every 24 hours, Starting Thu 6/22/2023, Normal      nicotine (NICODERM CQ) 7 mg/24hr TD 24 hr patch Place 1 patch on the skin over 24 hours every 24 hours, Starting Thu 6/22/2023, Normal                 PDMP Review       None            ED Provider  Electronically Signed by             Miguel Angel Panda MD  04/22/24 0139

## 2024-04-19 ENCOUNTER — VBI (OUTPATIENT)
Dept: ADMINISTRATIVE | Facility: OTHER | Age: 59
End: 2024-04-19

## 2024-04-19 NOTE — LETTER
[facility]  [address]  [place] [address]    Date: 04/23/24    Nydia Davis [address] [place] [address]    Dear Nydia:                                                                                                                                Thank you for choosing [facility] emergency department for care.  Your primary care provider wants to make sure that your ongoing medical care is being addressed. If you require follow up care as a result of your emergency department visit, there are a few things the practice would like you to know.                As part of the network's continuing commitment to caring for our patients, we have added more same day appointments and have extended office hours to meet your medical needs. After hours, on-call physicians are available via your primary care provider's main office line.               We encourage you to contact our office prior to seeking treatment to discuss your symptoms with the medical staff.  Together, we can determine the correct course of action.  A majority of non-emergent conditions such as: common cold, flu-like symptoms, fevers, strains/sprains, dislocations, minor burns, cuts and animal bites can be treated at [facility] facilities. Diagnostic testing is available at some sites.               Of course, if you are experiencing a life threatening medical emergency call 911 or proceed directly to the nearest emergency room.    Your nearest [facility] facility is conveniently located at:    [place] [address]  [phone]  SKIP THE WAIT  Conveniently offered at most [facility] locations  [place] your spot online at [email or web address] or on the [facility]    Sincerely,    [facility]  No information on file.

## 2024-04-21 DIAGNOSIS — E78.5 HYPERLIPIDEMIA, UNSPECIFIED HYPERLIPIDEMIA TYPE: ICD-10-CM

## 2024-04-22 RX ORDER — ATORVASTATIN CALCIUM 10 MG/1
10 TABLET, FILM COATED ORAL DAILY
Qty: 90 TABLET | Refills: 3 | Status: SHIPPED | OUTPATIENT
Start: 2024-04-22

## 2024-04-22 NOTE — TELEPHONE ENCOUNTER
04/22/24 3:55 PM    Patient contacted post ED visit, second outreach attempt made. Message was left for patient to return a call to the V[facility] Department at [facility]: Phone [phone].    Thank you.  Edin Young MA  [facility]

## 2024-04-23 NOTE — TELEPHONE ENCOUNTER
04/23/24 3:10 PM    Patient contacted post ED visit, phone outreaches were unsuccessful and a MyChart letter has been sent to the patient as follow-up.    Thank you.  Edin Young MA  [facility]

## 2024-06-19 ENCOUNTER — RA CDI HCC (OUTPATIENT)
Dept: OTHER | Facility: HOSPITAL | Age: 59
End: 2024-06-19

## 2024-06-26 ENCOUNTER — OFFICE VISIT (OUTPATIENT)
Dept: INTERNAL MEDICINE CLINIC | Facility: CLINIC | Age: 59
End: 2024-06-26
Payer: COMMERCIAL

## 2024-06-26 VITALS
HEART RATE: 71 BPM | RESPIRATION RATE: 20 BRPM | HEIGHT: 64 IN | OXYGEN SATURATION: 97 % | WEIGHT: 134.8 LBS | TEMPERATURE: 98.6 F | DIASTOLIC BLOOD PRESSURE: 74 MMHG | SYSTOLIC BLOOD PRESSURE: 114 MMHG | BODY MASS INDEX: 23.01 KG/M2

## 2024-06-26 DIAGNOSIS — Z00.00 ANNUAL PHYSICAL EXAM: Primary | ICD-10-CM

## 2024-06-26 DIAGNOSIS — E78.2 MIXED HYPERLIPIDEMIA: ICD-10-CM

## 2024-06-26 DIAGNOSIS — R53.83 FATIGUE, UNSPECIFIED TYPE: ICD-10-CM

## 2024-06-26 PROCEDURE — 99396 PREV VISIT EST AGE 40-64: CPT | Performed by: PHYSICIAN ASSISTANT

## 2024-06-26 RX ORDER — VENLAFAXINE HYDROCHLORIDE 37.5 MG/1
37.5 CAPSULE, EXTENDED RELEASE ORAL DAILY
COMMUNITY
Start: 2024-06-07

## 2024-06-26 NOTE — PROGRESS NOTES
Assessment/Plan:    General medical exam remains good.  Continue current medications.  Have labs done and I will communicate the results to you when available.  Would recommend follow-up in 1 year for annual exam.  Quality Measures:   Depression Screening and Follow-up Plan: Patient was screened for depression during today's encounter. They screened negative with a PHQ-2 score of 0.         Return in about 1 year (around 6/26/2025) for Annual physical.         Diagnoses and all orders for this visit:    Annual physical exam    Mixed hyperlipidemia  -     Comprehensive metabolic panel; Future  -     Lipid panel; Future    Fatigue, unspecified type  -     CBC and differential; Future    Other orders  -     venlafaxine (EFFEXOR-XR) 37.5 mg 24 hr capsule; Take 37.5 mg by mouth daily          Subjective:      Patient ID: Nydia Russell is a 59 y.o. female.    59-year-old female presents for her annual physical.  She reports feeling well, no focal issues.    History of high per lipidemia on atorvastatin 10 mg daily.  We will order labs to do and I will discuss results with her when available.    Patient states she has recently been started on venlafaxine 37.5 mg daily by her gynecologist for menopausal symptoms.  She states she is found it to be very effective.    All screenings are up-to-date.    EMR has been reviewed, clarified, and updated with patient today.        ALLERGIES:  No Known Allergies    CURRENT MEDICATIONS:    Current Outpatient Medications:     albuterol (PROVENTIL HFA,VENTOLIN HFA) 90 mcg/act inhaler, Inhale 2 puffs every 6 (six) hours as needed for wheezing, Disp: 8.5 g, Rfl: 5    atorvastatin (LIPITOR) 10 mg tablet, TAKE 1 TABLET BY MOUTH EVERY DAY, Disp: 90 tablet, Rfl: 3    azelastine (ASTELIN) 0.1 % nasal spray, 1 spray into each nostril 2 (two) times a day Use in each nostril as directed, Disp: 30 mL, Rfl: 3    venlafaxine (EFFEXOR-XR) 37.5 mg 24 hr capsule, Take 37.5 mg by mouth daily, Disp: ,  Rfl:     ACTIVE PROBLEM LIST:  Patient Active Problem List   Diagnosis    Lumbar facet joint syndrome    Hyperlipidemia    Vitamin D deficiency       PAST MEDICAL HISTORY:  Past Medical History:   Diagnosis Date    Allergic     Headache(784.0)        PAST SURGICAL HISTORY:  Past Surgical History:   Procedure Laterality Date    NASAL FRACTURE SURGERY      OVARIAN CYST REMOVAL      RADIOFREQUENCY ABLATION      UTERINE FIBROID SURGERY         FAMILY HISTORY:  Family History   Problem Relation Age of Onset    Uterine cancer Mother     Diabetes Mother     Coronary artery disease Father     Lung cancer Father     Uterine cancer Sister     Hyperlipidemia Brother     Coronary artery disease Maternal Grandmother     Cancer Maternal Grandfather     Coronary artery disease Paternal Grandfather        SOCIAL HISTORY:  Social History     Socioeconomic History    Marital status: Single     Spouse name: Not on file    Number of children: Not on file    Years of education: Not on file    Highest education level: Not on file   Occupational History    Not on file   Tobacco Use    Smoking status: Former     Current packs/day: 0.00     Average packs/day: 0.5 packs/day for 30.0 years (15.0 ttl pk-yrs)     Types: Cigarettes     Quit date: 2023     Years since quittin.0    Smokeless tobacco: Never   Vaping Use    Vaping status: Never Used   Substance and Sexual Activity    Alcohol use: Yes     Alcohol/week: 1.0 standard drink of alcohol     Types: 1 Glasses of wine per week     Comment: 1 drink/week    Drug use: Never    Sexual activity: Yes     Partners: Male     Birth control/protection: Post-menopausal   Other Topics Concern    Not on file   Social History Narrative        No children        Hobbies-horseback riding    Regular dental care, brushes twice a day     Social Determinants of Health     Financial Resource Strain: Not on file   Food Insecurity: Not on file   Transportation Needs: Not on file  "  Physical Activity: Not on file   Stress: Not on file   Social Connections: Not on file   Intimate Partner Violence: Not on file   Housing Stability: Not on file       Review of Systems   Constitutional:  Negative for activity change, chills, fatigue and fever.   HENT:  Negative for congestion.    Eyes:  Negative for discharge.   Respiratory:  Negative for cough, chest tightness and shortness of breath.    Cardiovascular:  Negative for chest pain, palpitations and leg swelling.   Gastrointestinal:  Negative for abdominal pain, blood in stool, constipation, diarrhea, nausea and vomiting.   Endocrine: Negative for polydipsia, polyphagia and polyuria.   Genitourinary:  Negative for difficulty urinating.   Musculoskeletal:  Negative for arthralgias and myalgias.   Skin:  Negative for rash.   Allergic/Immunologic: Negative for immunocompromised state.   Neurological:  Negative for dizziness, syncope, weakness, light-headedness and headaches.   Hematological:  Negative for adenopathy. Does not bruise/bleed easily.   Psychiatric/Behavioral:  Negative for dysphoric mood, sleep disturbance and suicidal ideas. The patient is not nervous/anxious.          Objective:  Vitals:    06/26/24 1530   BP: 114/74   BP Location: Left arm   Patient Position: Sitting   Cuff Size: Adult   Pulse: 71   Resp: 20   Temp: 98.6 °F (37 °C)   TempSrc: Tympanic   SpO2: 97%   Weight: 61.1 kg (134 lb 12.8 oz)   Height: 5' 4\" (1.626 m)     Body mass index is 23.14 kg/m².     Physical Exam  Vitals and nursing note reviewed.   Constitutional:       General: She is not in acute distress.     Appearance: She is well-developed. She is not ill-appearing.      Comments: Well-developed, well-nourished 59-year-old female appearing younger than stated age in no acute distress.   HENT:      Head: Normocephalic and atraumatic.      Right Ear: Tympanic membrane, ear canal and external ear normal.      Left Ear: Tympanic membrane, ear canal and external ear " normal.      Nose: Nose normal.      Mouth/Throat:      Mouth: Mucous membranes are moist.      Pharynx: Oropharynx is clear.   Eyes:      General: No scleral icterus.        Right eye: No discharge.         Left eye: No discharge.      Extraocular Movements: Extraocular movements intact.      Conjunctiva/sclera: Conjunctivae normal.      Pupils: Pupils are equal, round, and reactive to light.   Neck:      Thyroid: No thyromegaly.      Vascular: No carotid bruit.   Cardiovascular:      Rate and Rhythm: Normal rate and regular rhythm.      Pulses: Normal pulses.      Heart sounds: Normal heart sounds. No murmur heard.  Pulmonary:      Effort: Pulmonary effort is normal. No respiratory distress.      Comments: Light decreased breath sounds but overall clear.  Chest:      Chest wall: No tenderness.   Abdominal:      General: Abdomen is flat. Bowel sounds are normal. There is no distension.      Palpations: Abdomen is soft. There is no hepatomegaly, splenomegaly or mass.      Tenderness: There is no abdominal tenderness. There is no right CVA tenderness, left CVA tenderness, guarding or rebound.   Musculoskeletal:         General: No swelling. Normal range of motion.      Cervical back: Normal range of motion and neck supple.      Right lower leg: No edema.      Left lower leg: No edema.   Lymphadenopathy:      Cervical: No cervical adenopathy.   Skin:     General: Skin is warm and dry.      Findings: No rash.      Comments: Dry skin present on pretibial areas.   Neurological:      General: No focal deficit present.      Mental Status: She is alert and oriented to person, place, and time.      Cranial Nerves: No cranial nerve deficit.      Sensory: No sensory deficit.      Motor: No weakness.      Coordination: Coordination normal.      Gait: Gait normal.      Deep Tendon Reflexes: Reflexes are normal and symmetric. Reflexes normal.   Psychiatric:         Mood and Affect: Mood normal.         Behavior: Behavior normal.          Thought Content: Thought content normal.         Judgment: Judgment normal.           RESULTS:  Cholesterol, Total   Date/Time Value Ref Range Status   06/24/2023 10:38  100 - 199 mg/dL Final     Triglycerides   Date/Time Value Ref Range Status   06/24/2023 10:38 AM 68 0 - 149 mg/dL Final     HDL   Date/Time Value Ref Range Status   06/24/2023 10:38 AM 59 >39 mg/dL Final     LDL Calculated   Date/Time Value Ref Range Status   06/24/2023 10:38 AM 70 0 - 99 mg/dL Final     Hemoglobin   Date/Time Value Ref Range Status   04/17/2024 09:17 PM 12.2 11.5 - 15.4 g/dL Final     Hematocrit   Date/Time Value Ref Range Status   04/17/2024 09:17 PM 38.8 34.8 - 46.1 % Final     Platelets   Date/Time Value Ref Range Status   04/17/2024 09:17  149 - 390 Thousands/uL Final     Free t4   Date/Time Value Ref Range Status   06/22/2019 10:22 AM 1.42 0.82 - 1.77 ng/dL Final     Sodium   Date/Time Value Ref Range Status   04/17/2024 09:17  135 - 147 mmol/L Final   06/24/2023 10:38  134 - 144 mmol/L Final   12/05/2022 10:23  136 - 145 mmol/L Final     BUN   Date/Time Value Ref Range Status   04/17/2024 09:17 PM 16 5 - 25 mg/dL Final   06/24/2023 10:38 AM 12 6 - 24 mg/dL Final   12/05/2022 10:23 AM 21 7 - 25 mg/dL Final     Creatinine   Date/Time Value Ref Range Status   04/17/2024 09:17 PM 0.65 0.60 - 1.30 mg/dL Final     Comment:     Standardized to IDMS reference method   12/05/2022 10:23 AM 0.86 0.60 - 1.20 mg/dL Final      In chart    This note was created with voice recognition software.  Phonic, grammatical and spelling errors may be present within the note as a result.

## 2024-06-26 NOTE — PATIENT INSTRUCTIONS
"General medical exam remains good.  Continue current medications.  Have labs done and I will communicate the results to you when available.  Would recommend follow-up in 1 year for annual exam.    Patient Education     Routine physical for adults   The Basics   Written by the doctors and editors at [facility]   What is a physical? -- A physical is a routine visit, or \"check-up,\" with your doctor. You might also hear it called a \"wellness visit\" or \"preventive visit.\"  During each visit, the doctor will:   Ask about your physical and mental health   Ask about your habits, behaviors, and lifestyle   Do an exam   Give you vaccines if needed   Talk to you about any medicines you take   Give advice about your health   Answer your questions  Getting regular check-ups is an important part of taking care of your health. It can help your doctor find and treat any problems you have. But it's also important for preventing health problems.  A routine physical is different from a \"sick visit.\" A sick visit is when you see a doctor because of a health concern or problem. Since physicals are scheduled ahead of time, you can think about what you want to ask the doctor.  How often should I get a physical? -- It depends on your age and health. In general, for people age 21 years and older:   If you are younger than 50 years, you might be able to get a physical every 3 years.   If you are 50 years or older, your doctor might recommend a physical every year.  If you have an ongoing health condition, like diabetes or high blood pressure, your doctor will probably want to see you more often.  What happens during a physical? -- In general, each visit will include:   Physical exam - The doctor or nurse will check your height, weight, heart rate, and blood pressure. They will also look at your eyes and ears. They will ask about how you are feeling and whether you have any symptoms that bother you.   Medicines - It's a good idea to bring a " "list of all the medicines you take to each doctor visit. Your doctor will talk to you about your medicines and answer any questions. Tell them if you are having any side effects that bother you. You should also tell them if you are having trouble paying for any of your medicines.   Habits and behaviors - This includes:   Your diet   Your exercise habits   Whether you smoke, drink alcohol, or use drugs   Whether you are sexually active   Whether you feel safe at home  Your doctor will talk to you about things you can do to improve your health and lower your risk of health problems. They will also offer help and support. For example, if you want to quit smoking, they can give you advice and might prescribe medicines. If you want to improve your diet or get more physical activity, they can help you with this, too.   Lab tests, if needed - The tests you get will depend on your age and situation. For example, your doctor might want to check your:   Cholesterol   Blood sugar   Iron level   Vaccines - The recommended vaccines will depend on your age, health, and what vaccines you already had. Vaccines are very important because they can prevent certain serious or deadly infections.   Discussion of screening - \"Screening\" means checking for diseases or other health problems before they cause symptoms. Your doctor can recommend screening based on your age, risk, and preferences. This might include tests to check for:   Cancer, such as breast, prostate, cervical, ovarian, colorectal, prostate, lung, or skin cancer   Sexually transmitted infections, such as chlamydia and gonorrhea   Mental health conditions like depression and anxiety  Your doctor will talk to you about the different types of screening tests. They can help you decide which screenings to have. They can also explain what the results might mean.   Answering questions - The physical is a good time to ask the doctor or nurse questions about your health. If needed, " they can refer you to other doctors or specialists, too.  Adults older than 65 years often need other care, too. As you get older, your doctor will talk to you about:   How to prevent falling at home   Hearing or vision tests   Memory testing   How to take your medicines safely   Making sure that you have the help and support you need at home  All topics are updated as new evidence becomes available and our peer review process is complete.  This topic retrieved from [employer] on: May 02, 2024.  Topic 847448 Version 1.0  Release: 32.4.3 - C32.122  © 2024 UpToDate, Inc. and/or its affiliates. All rights reserved.  Consumer Information Use and Disclaimer   Disclaimer: This generalized information is a limited summary of diagnosis, treatment, and/or medication information. It is not meant to be comprehensive and should be used as a tool to help the user understand and/or assess potential diagnostic and treatment options. It does NOT include all information about conditions, treatments, medications, side effects, or risks that may apply to a specific patient. It is not intended to be medical advice or a substitute for the medical advice, diagnosis, or treatment of a health care provider based on the health care provider's examination and assessment of a patient's specific and unique circumstances. Patients must speak with a health care provider for complete information about their health, medical questions, and treatment options, including any risks or benefits regarding use of medications. This information does not endorse any treatments or medications as safe, effective, or approved for treating a specific patient. UpToDate, Inc. and its affiliates disclaim any warranty or liability relating to this information or the use thereof.The use of this information is governed by the Terms of Use, available at https://www.[employer]ters[employer]otechuwer.com/en/know/clinical-effectiveness-terms. 2024© UpToDate, Inc. and its affiliates and/or  licensors. All rights reserved.  Copyright   © 2024 [employer] and/or its affiliates. All rights reserved.

## 2024-07-30 DIAGNOSIS — E78.5 HYPERLIPIDEMIA, UNSPECIFIED HYPERLIPIDEMIA TYPE: ICD-10-CM

## 2024-07-31 RX ORDER — ATORVASTATIN CALCIUM 10 MG/1
10 TABLET, FILM COATED ORAL DAILY
Qty: 30 TABLET | Refills: 0 | Status: SHIPPED | OUTPATIENT
Start: 2024-07-31

## 2024-08-04 DIAGNOSIS — N95.1 MENOPAUSAL SYMPTOMS: Primary | ICD-10-CM

## 2024-08-05 RX ORDER — VENLAFAXINE HYDROCHLORIDE 37.5 MG/1
37.5 CAPSULE, EXTENDED RELEASE ORAL DAILY
Qty: 30 CAPSULE | Refills: 5 | Status: SHIPPED | OUTPATIENT
Start: 2024-08-05

## 2024-08-26 DIAGNOSIS — E78.5 HYPERLIPIDEMIA, UNSPECIFIED HYPERLIPIDEMIA TYPE: ICD-10-CM

## 2024-08-27 DIAGNOSIS — E78.5 HYPERLIPIDEMIA, UNSPECIFIED HYPERLIPIDEMIA TYPE: ICD-10-CM

## 2024-08-27 RX ORDER — ATORVASTATIN CALCIUM 10 MG/1
10 TABLET, FILM COATED ORAL DAILY
Qty: 90 TABLET | Refills: 1 | Status: SHIPPED | OUTPATIENT
Start: 2024-08-27 | End: 2024-08-27 | Stop reason: SDUPTHER

## 2024-08-28 RX ORDER — ATORVASTATIN CALCIUM 10 MG/1
10 TABLET, FILM COATED ORAL DAILY
Qty: 30 TABLET | Refills: 0 | Status: SHIPPED | OUTPATIENT
Start: 2024-08-28

## 2024-09-14 DIAGNOSIS — N95.1 MENOPAUSAL SYMPTOMS: ICD-10-CM

## 2024-09-16 RX ORDER — VENLAFAXINE HYDROCHLORIDE 37.5 MG/1
37.5 CAPSULE, EXTENDED RELEASE ORAL DAILY
Qty: 30 CAPSULE | Refills: 5 | Status: SHIPPED | OUTPATIENT
Start: 2024-09-16

## 2024-10-13 DIAGNOSIS — N95.1 MENOPAUSAL SYMPTOMS: ICD-10-CM

## 2024-10-15 RX ORDER — VENLAFAXINE HYDROCHLORIDE 37.5 MG/1
37.5 CAPSULE, EXTENDED RELEASE ORAL DAILY
Qty: 30 CAPSULE | Refills: 0 | Status: SHIPPED | OUTPATIENT
Start: 2024-10-15

## 2024-11-11 DIAGNOSIS — N95.1 MENOPAUSAL SYMPTOMS: ICD-10-CM

## 2024-11-13 RX ORDER — VENLAFAXINE HYDROCHLORIDE 37.5 MG/1
37.5 CAPSULE, EXTENDED RELEASE ORAL DAILY
Qty: 30 CAPSULE | Refills: 5 | Status: SHIPPED | OUTPATIENT
Start: 2024-11-13

## 2025-02-23 DIAGNOSIS — E78.5 HYPERLIPIDEMIA, UNSPECIFIED HYPERLIPIDEMIA TYPE: ICD-10-CM

## 2025-02-24 RX ORDER — ATORVASTATIN CALCIUM 10 MG/1
10 TABLET, FILM COATED ORAL DAILY
Qty: 30 TABLET | Refills: 0 | Status: SHIPPED | OUTPATIENT
Start: 2025-02-24

## 2025-03-28 DIAGNOSIS — E78.5 HYPERLIPIDEMIA, UNSPECIFIED HYPERLIPIDEMIA TYPE: ICD-10-CM

## 2025-03-31 RX ORDER — ATORVASTATIN CALCIUM 10 MG/1
10 TABLET, FILM COATED ORAL DAILY
Qty: 30 TABLET | Refills: 5 | Status: SHIPPED | OUTPATIENT
Start: 2025-03-31

## 2025-06-26 ENCOUNTER — APPOINTMENT (OUTPATIENT)
Age: 60
End: 2025-06-26
Attending: PHYSICIAN ASSISTANT
Payer: COMMERCIAL

## 2025-06-26 DIAGNOSIS — E78.2 MIXED HYPERLIPIDEMIA: ICD-10-CM

## 2025-06-26 DIAGNOSIS — R53.83 FATIGUE, UNSPECIFIED TYPE: ICD-10-CM

## 2025-06-26 LAB
ALBUMIN SERPL BCG-MCNC: 4.5 G/DL (ref 3.5–5)
ALP SERPL-CCNC: 76 U/L (ref 34–104)
ALT SERPL W P-5'-P-CCNC: 18 U/L (ref 7–52)
ANION GAP SERPL CALCULATED.3IONS-SCNC: 10 MMOL/L (ref 4–13)
AST SERPL W P-5'-P-CCNC: 23 U/L (ref 13–39)
BASOPHILS # BLD AUTO: 0.07 THOUSANDS/ÂΜL (ref 0–0.1)
BASOPHILS NFR BLD AUTO: 1 % (ref 0–1)
BILIRUB SERPL-MCNC: 0.64 MG/DL (ref 0.2–1)
BUN SERPL-MCNC: 21 MG/DL (ref 5–25)
CALCIUM SERPL-MCNC: 9.6 MG/DL (ref 8.4–10.2)
CHLORIDE SERPL-SCNC: 103 MMOL/L (ref 96–108)
CHOLEST SERPL-MCNC: 146 MG/DL (ref ?–200)
CO2 SERPL-SCNC: 26 MMOL/L (ref 21–32)
CREAT SERPL-MCNC: 0.62 MG/DL (ref 0.6–1.3)
EOSINOPHIL # BLD AUTO: 0.31 THOUSAND/ÂΜL (ref 0–0.61)
EOSINOPHIL NFR BLD AUTO: 3 % (ref 0–6)
ERYTHROCYTE [DISTWIDTH] IN BLOOD BY AUTOMATED COUNT: 13.5 % (ref 11.6–15.1)
GFR SERPL CREATININE-BSD FRML MDRD: 98 ML/MIN/1.73SQ M
GLUCOSE P FAST SERPL-MCNC: 85 MG/DL (ref 65–99)
HCT VFR BLD AUTO: 39 % (ref 34.8–46.1)
HDLC SERPL-MCNC: 71 MG/DL
HGB BLD-MCNC: 13 G/DL (ref 11.5–15.4)
IMM GRANULOCYTES # BLD AUTO: 0.02 THOUSAND/UL (ref 0–0.2)
IMM GRANULOCYTES NFR BLD AUTO: 0 % (ref 0–2)
LDLC SERPL CALC-MCNC: 60 MG/DL (ref 0–100)
LYMPHOCYTES # BLD AUTO: 2.74 THOUSANDS/ÂΜL (ref 0.6–4.47)
LYMPHOCYTES NFR BLD AUTO: 31 % (ref 14–44)
MCH RBC QN AUTO: 28 PG (ref 26.8–34.3)
MCHC RBC AUTO-ENTMCNC: 33.3 G/DL (ref 31.4–37.4)
MCV RBC AUTO: 84 FL (ref 82–98)
MONOCYTES # BLD AUTO: 0.67 THOUSAND/ÂΜL (ref 0.17–1.22)
MONOCYTES NFR BLD AUTO: 8 % (ref 4–12)
NEUTROPHILS # BLD AUTO: 5.18 THOUSANDS/ÂΜL (ref 1.85–7.62)
NEUTS SEG NFR BLD AUTO: 57 % (ref 43–75)
NONHDLC SERPL-MCNC: 75 MG/DL
NRBC BLD AUTO-RTO: 0 /100 WBCS
PLATELET # BLD AUTO: 317 THOUSANDS/UL (ref 149–390)
PMV BLD AUTO: 10.4 FL (ref 8.9–12.7)
POTASSIUM SERPL-SCNC: 4 MMOL/L (ref 3.5–5.3)
PROT SERPL-MCNC: 7.2 G/DL (ref 6.4–8.4)
RBC # BLD AUTO: 4.65 MILLION/UL (ref 3.81–5.12)
SODIUM SERPL-SCNC: 139 MMOL/L (ref 135–147)
TRIGL SERPL-MCNC: 75 MG/DL (ref ?–150)
WBC # BLD AUTO: 8.99 THOUSAND/UL (ref 4.31–10.16)

## 2025-06-26 PROCEDURE — 36415 COLL VENOUS BLD VENIPUNCTURE: CPT

## 2025-06-26 PROCEDURE — 80053 COMPREHEN METABOLIC PANEL: CPT

## 2025-06-26 PROCEDURE — 80061 LIPID PANEL: CPT

## 2025-06-26 PROCEDURE — 85025 COMPLETE CBC W/AUTO DIFF WBC: CPT

## 2025-07-01 ENCOUNTER — OFFICE VISIT (OUTPATIENT)
Dept: INTERNAL MEDICINE CLINIC | Facility: CLINIC | Age: 60
End: 2025-07-01
Payer: COMMERCIAL

## 2025-07-01 VITALS
HEART RATE: 73 BPM | SYSTOLIC BLOOD PRESSURE: 104 MMHG | OXYGEN SATURATION: 99 % | WEIGHT: 140 LBS | DIASTOLIC BLOOD PRESSURE: 70 MMHG | TEMPERATURE: 98 F | HEIGHT: 65 IN | BODY MASS INDEX: 23.32 KG/M2 | RESPIRATION RATE: 16 BRPM

## 2025-07-01 DIAGNOSIS — E78.2 MIXED HYPERLIPIDEMIA: ICD-10-CM

## 2025-07-01 DIAGNOSIS — Z00.00 ANNUAL PHYSICAL EXAM: Primary | ICD-10-CM

## 2025-07-01 PROCEDURE — 99396 PREV VISIT EST AGE 40-64: CPT | Performed by: PHYSICIAN ASSISTANT

## 2025-07-01 NOTE — PATIENT INSTRUCTIONS
"General medical exam remains good.  Continue current medications.  Follow-up with specialist as indicated.  Follow-up 1 year for annual physical, sooner as needed.        Patient Education     Routine physical for adults   The Basics   Written by the doctors and editors at [facility]   What is a physical? -- A physical is a routine visit, or \"check-up,\" with your doctor. You might also hear it called a \"wellness visit\" or \"preventive visit.\"  During each visit, the doctor will:   Ask about your physical and mental health   Ask about your habits, behaviors, and lifestyle   Do an exam   Give you vaccines if needed   Talk to you about any medicines you take   Give advice about your health   Answer your questions  Getting regular check-ups is an important part of taking care of your health. It can help your doctor find and treat any problems you have. But it's also important for preventing health problems.  A routine physical is different from a \"sick visit.\" A sick visit is when you see a doctor because of a health concern or problem. Since physicals are scheduled ahead of time, you can think about what you want to ask the doctor.  How often should I get a physical? -- It depends on your age and health. In general, for people age 21 years and older:   If you are younger than 50 years, you might be able to get a physical every 3 years.   If you are 50 years or older, your doctor might recommend a physical every year.  If you have an ongoing health condition, like diabetes or high blood pressure, your doctor will probably want to see you more often.  What happens during a physical? -- In general, each visit will include:   Physical exam - The doctor or nurse will check your height, weight, heart rate, and blood pressure. They will also look at your eyes and ears. They will ask about how you are feeling and whether you have any symptoms that bother you.   Medicines - It's a good idea to bring a list of all the medicines you " "take to each doctor visit. Your doctor will talk to you about your medicines and answer any questions. Tell them if you are having any side effects that bother you. You should also tell them if you are having trouble paying for any of your medicines.   Habits and behaviors - This includes:   Your diet   Your exercise habits   Whether you smoke, drink alcohol, or use drugs   Whether you are sexually active   Whether you feel safe at home  Your doctor will talk to you about things you can do to improve your health and lower your risk of health problems. They will also offer help and support. For example, if you want to quit smoking, they can give you advice and might prescribe medicines. If you want to improve your diet or get more physical activity, they can help you with this, too.   Lab tests, if needed - The tests you get will depend on your age and situation. For example, your doctor might want to check your:   Cholesterol   Blood sugar   Iron level   Vaccines - The recommended vaccines will depend on your age, health, and what vaccines you already had. Vaccines are very important because they can prevent certain serious or deadly infections.   Discussion of screening - \"Screening\" means checking for diseases or other health problems before they cause symptoms. Your doctor can recommend screening based on your age, risk, and preferences. This might include tests to check for:   Cancer, such as breast, prostate, cervical, ovarian, colorectal, prostate, lung, or skin cancer   Sexually transmitted infections, such as chlamydia and gonorrhea   Mental health conditions like depression and anxiety  Your doctor will talk to you about the different types of screening tests. They can help you decide which screenings to have. They can also explain what the results might mean.   Answering questions - The physical is a good time to ask the doctor or nurse questions about your health. If needed, they can refer you to other " doctors or specialists, too.  Adults older than 65 years often need other care, too. As you get older, your doctor will talk to you about:   How to prevent falling at home   Hearing or vision tests   Memory testing   How to take your medicines safely   Making sure that you have the help and support you need at home  All topics are updated as new evidence becomes available and our peer review process is complete.  This topic retrieved from [employer] on: May 02, 2024.  Topic 747526 Version 1.0  Release: 32.4.3 - C32.122  © 2024 UpToDate, Inc. and/or its affiliates. All rights reserved.  Consumer Information Use and Disclaimer   Disclaimer: This generalized information is a limited summary of diagnosis, treatment, and/or medication information. It is not meant to be comprehensive and should be used as a tool to help the user understand and/or assess potential diagnostic and treatment options. It does NOT include all information about conditions, treatments, medications, side effects, or risks that may apply to a specific patient. It is not intended to be medical advice or a substitute for the medical advice, diagnosis, or treatment of a health care provider based on the health care provider's examination and assessment of a patient's specific and unique circumstances. Patients must speak with a health care provider for complete information about their health, medical questions, and treatment options, including any risks or benefits regarding use of medications. This information does not endorse any treatments or medications as safe, effective, or approved for treating a specific patient. UpToDate, Inc. and its affiliates disclaim any warranty or liability relating to this information or the use thereof.The use of this information is governed by the Terms of Use, available at https://www.wolterskluwer.com/en/know/clinical-effectiveness-terms. 2024© UpToDate, Inc. and its affiliates and/or licensors. All rights  reserved.  Copyright   © 2024 [employer] and/or its affiliates. All rights reserved.

## 2025-07-01 NOTE — PROGRESS NOTES
Adult Annual Physical  Name: Nydia Russell      : 1965      MRN: [id number]  Encounter Provider: Allan Pedraza PA-C  Encounter Date: 2025   Encounter department: [facility] [place]    :  Assessment & Plan  Annual physical exam               Preventive Screenings:  - Diabetes Screening: screening up-to-date and orders placed  - Cholesterol Screening: screening not indicated, has hyperlipidemia, screening up-to-date and orders placed   - Hepatitis C screening: screening up-to-date   - HIV screening: screening up-to-date   - Cervical cancer screening: screening up-to-date   - Breast cancer screening: screening up-to-date   - Colon cancer screening: screening up-to-date   - Lung cancer screening: screening not indicated     Immunizations:  - Immunizations due: Prevnar 20  - Risks/benefits immunizations discussed        Depression Screening and Follow-up Plan: Patient was screened for depression during today's encounter. They screened negative with a PHQ-2 score of 0.        History of Present Illness     Adult Annual Physical:  Patient presents for annual physical. 60-year-old female presents for her annual physical.  Labs reviewed with patient.  Overall feels well.  Concerned about 20 pound weight gain over the past 2 years since she stopped smoking and is in menopause.  BMI is still 23.  No other focal complaints.    EMR has been reviewed, clarified, and updated with patient today..     Diet and Physical Activity:  - Diet/Nutrition: no special diet, low calorie diet, low fat diet and limited junk food.  - Exercise: walking and 3-4 times a week on average.    Depression Screening:  - PHQ-2 Score: 0    General Health:  - Sleep: 4-6 hours of sleep on average and sleeps well.  - Hearing: normal hearing bilateral ears.  - Vision: most recent eye exam < 1 year ago and wears glasses and contacts.  - Dental: regular dental visits, brushes teeth three times daily and floss  "regularly.    /GYN Health:  - Follows with GYN: yes.   - Menopause: postmenopausal.   - History of STDs: no  - Contraception: menopause.      Advanced Care Planning:  - Has an advanced directive?: no    - Has a durable medical POA?: no    - ACP document given to patient?: yes      Review of Systems   Constitutional:  Negative for activity change, chills, fatigue and fever.   HENT:  Negative for congestion.    Eyes:  Positive for visual disturbance (Wears glasses). Negative for discharge.   Respiratory:  Negative for cough, chest tightness and shortness of breath.    Cardiovascular:  Negative for chest pain, palpitations and leg swelling.   Gastrointestinal:  Negative for abdominal pain, blood in stool, constipation, diarrhea, nausea and vomiting.   Endocrine: Negative for polydipsia, polyphagia and polyuria.   Genitourinary:  Negative for difficulty urinating.   Musculoskeletal:  Negative for arthralgias and myalgias.   Skin:  Negative for rash.   Allergic/Immunologic: Negative for immunocompromised state.   Neurological:  Negative for dizziness, syncope, weakness, light-headedness and headaches.   Hematological:  Negative for adenopathy. Does not bruise/bleed easily.   Psychiatric/Behavioral:  Negative for dysphoric mood, sleep disturbance and suicidal ideas. The patient is not nervous/anxious.          Objective   /70 (BP Location: Left arm, Patient Position: Sitting, Cuff Size: Standard)   Pulse 73   Temp 98 °F (36.7 °C) (Tympanic)   Resp 16   Ht 5' 5\" (1.651 m)   Wt 63.5 kg (140 lb)   SpO2 99%   BMI 23.30 kg/m²     Physical Exam  Vitals and nursing note reviewed.   Constitutional:       General: She is not in acute distress.     Appearance: She is well-developed. She is not ill-appearing.      Comments: Well-developed, well-nourished 60-year-old female appearing younger than stated age in no acute distress.   HENT:      Head: Normocephalic and atraumatic.      Right Ear: Tympanic membrane, ear " canal and external ear normal.      Left Ear: Tympanic membrane, ear canal and external ear normal.      Nose: Nose normal.      Mouth/Throat:      Mouth: Mucous membranes are moist.      Pharynx: Oropharynx is clear. No oropharyngeal exudate.     Eyes:      Extraocular Movements: Extraocular movements intact.      Conjunctiva/sclera: Conjunctivae normal.      Pupils: Pupils are equal, round, and reactive to light.     Neck:      Thyroid: No thyromegaly.      Vascular: No carotid bruit or JVD.     Cardiovascular:      Rate and Rhythm: Normal rate and regular rhythm.      Heart sounds: Normal heart sounds. No murmur heard.  Pulmonary:      Effort: Pulmonary effort is normal.      Breath sounds: Normal breath sounds.   Chest:      Chest wall: No tenderness.   Abdominal:      General: Bowel sounds are normal.      Palpations: Abdomen is soft. There is no hepatomegaly, splenomegaly or mass.      Tenderness: There is no abdominal tenderness. There is no right CVA tenderness or left CVA tenderness.     Musculoskeletal:         General: No swelling or tenderness. Normal range of motion.      Cervical back: Normal range of motion and neck supple.      Right lower leg: No edema.      Left lower leg: No edema.   Lymphadenopathy:      Cervical: No cervical adenopathy.     Skin:     General: Skin is warm and dry.      Findings: No rash.     Neurological:      General: No focal deficit present.      Mental Status: She is alert and oriented to person, place, and time. Mental status is at baseline.      Cranial Nerves: No cranial nerve deficit.      Sensory: No sensory deficit.      Motor: No weakness.      Coordination: Coordination normal.      Gait: Gait normal.      Deep Tendon Reflexes: Reflexes normal.     Psychiatric:         Mood and Affect: Mood normal.         Behavior: Behavior normal.         Thought Content: Thought content normal.         Judgment: Judgment normal.

## 2025-07-01 NOTE — ASSESSMENT & PLAN NOTE
Very good control on atorvastatin 10 mg daily.  Patient will continue this medication, we will continue to monitor with her.